# Patient Record
Sex: FEMALE | Race: WHITE | NOT HISPANIC OR LATINO | Employment: UNEMPLOYED | URBAN - METROPOLITAN AREA
[De-identification: names, ages, dates, MRNs, and addresses within clinical notes are randomized per-mention and may not be internally consistent; named-entity substitution may affect disease eponyms.]

---

## 2017-03-07 ENCOUNTER — NON-PROVIDER VISIT (OUTPATIENT)
Dept: OCCUPATIONAL MEDICINE | Facility: CLINIC | Age: 42
End: 2017-03-07

## 2017-03-07 DIAGNOSIS — Z02.1 PRE-EMPLOYMENT DRUG SCREENING: ICD-10-CM

## 2017-03-07 LAB
AMP AMPHETAMINE: NORMAL
COC COCAINE: NORMAL
INT CON NEG: NORMAL
INT CON POS: NORMAL
MET METHAMPHETAMINES: NORMAL
OPI OPIATES: NORMAL
PCP PHENCYCLIDINE: NORMAL
POC DRUG COMMENT 753798-OCCUPATIONAL HEALTH: NORMAL
THC: NORMAL

## 2017-03-07 PROCEDURE — 80305 DRUG TEST PRSMV DIR OPT OBS: CPT | Performed by: PREVENTIVE MEDICINE

## 2018-06-13 ENCOUNTER — HOSPITAL ENCOUNTER (OUTPATIENT)
Facility: MEDICAL CENTER | Age: 43
End: 2018-06-14
Attending: OBSTETRICS & GYNECOLOGY | Admitting: OBSTETRICS & GYNECOLOGY

## 2018-06-13 VITALS
WEIGHT: 157 LBS | BODY MASS INDEX: 23.79 KG/M2 | HEIGHT: 68 IN | SYSTOLIC BLOOD PRESSURE: 132 MMHG | DIASTOLIC BLOOD PRESSURE: 78 MMHG | RESPIRATION RATE: 18 BRPM | HEART RATE: 95 BPM | TEMPERATURE: 97.8 F

## 2018-06-14 NOTE — PROGRESS NOTES
"  edc unknown  lmp unknown  gbs unknown    2330: pt to labor and delivery claiming she is \"5 months pregnant\", but does not know her lmp.  Pt states she is moving and was experiencing cramping.   Pt denies vaginal bleeding or leaking.  When asked by rn how many times she's been pregnant patient states more than 10.  Pt was seen at [facility] in , history of fetal demise with last pregnancy.  Pt states she has had 2 live births that were vaginal deliveries.  toco applied. Doppler fetal heart tones at 155 bpm.  vss.  Pt states she has an appointment end of .  Pt has an appointment with [facility] . Pt states she wants a picture when we do the ultrasound.  rn told patient that we will check and make sure she is not zuhair, however we probably will not be doing an ultrasound.  2350: Report to jackie magaña cnm.  Orders to d/c home if no uc's.   0015: pt denies cramping and states she feels a lot better since laying down and relaxing.  Pt discharged home in stable condition.  Pt given  labor precautions.  "

## 2018-07-16 ENCOUNTER — HOSPITAL ENCOUNTER (OUTPATIENT)
Facility: MEDICAL CENTER | Age: 43
End: 2018-07-16
Attending: PHYSICIAN ASSISTANT
Payer: MEDICAID

## 2018-07-16 ENCOUNTER — INITIAL PRENATAL (OUTPATIENT)
Dept: OBGYN | Facility: CLINIC | Age: 43
End: 2018-07-16
Payer: MEDICAID

## 2018-07-16 VITALS
WEIGHT: 162 LBS | DIASTOLIC BLOOD PRESSURE: 62 MMHG | HEIGHT: 68 IN | BODY MASS INDEX: 24.55 KG/M2 | SYSTOLIC BLOOD PRESSURE: 128 MMHG

## 2018-07-16 DIAGNOSIS — O09.893 SUPERVISION OF OTHER HIGH RISK PREGNANCIES, THIRD TRIMESTER: ICD-10-CM

## 2018-07-16 DIAGNOSIS — O09.523 ADVANCED MATERNAL AGE IN MULTIGRAVIDA, THIRD TRIMESTER: Primary | ICD-10-CM

## 2018-07-16 DIAGNOSIS — Z67.91 RH NEGATIVE STATE IN ANTEPARTUM PERIOD, THIRD TRIMESTER: ICD-10-CM

## 2018-07-16 DIAGNOSIS — O26.893 RH NEGATIVE STATE IN ANTEPARTUM PERIOD, THIRD TRIMESTER: ICD-10-CM

## 2018-07-16 DIAGNOSIS — Z32.01 PREGNANCY EXAMINATION OR TEST, POSITIVE RESULT: ICD-10-CM

## 2018-07-16 DIAGNOSIS — O09.523 ADVANCED MATERNAL AGE IN MULTIGRAVIDA, THIRD TRIMESTER: ICD-10-CM

## 2018-07-16 DIAGNOSIS — Z34.93 PREGNANCY WITH ADOPTION PLANNED, CURRENTLY IN THIRD TRIMESTER: ICD-10-CM

## 2018-07-16 PROBLEM — F19.11 HISTORY OF DRUG ABUSE (HCC): Status: ACTIVE | Noted: 2018-07-16

## 2018-07-16 PROBLEM — F12.90 MARIJUANA USE: Status: ACTIVE | Noted: 2018-07-16

## 2018-07-16 PROBLEM — O99.333 TOBACCO USE COMPLICATING PREGNANCY, THIRD TRIMESTER: Status: ACTIVE | Noted: 2018-07-16

## 2018-07-16 LAB
APPEARANCE UR: NORMAL
BILIRUB UR STRIP-MCNC: NORMAL MG/DL
COLOR UR AUTO: NORMAL
GLUCOSE UR STRIP.AUTO-MCNC: NEGATIVE MG/DL
INT CON NEG: NEGATIVE
INT CON POS: POSITIVE
KETONES UR STRIP.AUTO-MCNC: NORMAL MG/DL
LEUKOCYTE ESTERASE UR QL STRIP.AUTO: NORMAL
NITRITE UR QL STRIP.AUTO: NEGATIVE
PH UR STRIP.AUTO: 6 [PH] (ref 5–8)
POC URINE PREGNANCY TEST: POSITIVE
PROT UR QL STRIP: NORMAL MG/DL
RBC UR QL AUTO: NEGATIVE
SP GR UR STRIP.AUTO: 1.03
UROBILINOGEN UR STRIP-MCNC: NORMAL MG/DL

## 2018-07-16 PROCEDURE — 81025 URINE PREGNANCY TEST: CPT | Performed by: PHYSICIAN ASSISTANT

## 2018-07-16 PROCEDURE — 88175 CYTOPATH C/V AUTO FLUID REDO: CPT

## 2018-07-16 PROCEDURE — 59402 PR NEW OB HIGH RISK: CPT | Performed by: PHYSICIAN ASSISTANT

## 2018-07-16 PROCEDURE — 87591 N.GONORRHOEAE DNA AMP PROB: CPT

## 2018-07-16 PROCEDURE — 87624 HPV HI-RISK TYP POOLED RSLT: CPT

## 2018-07-16 PROCEDURE — 87491 CHLMYD TRACH DNA AMP PROBE: CPT

## 2018-07-16 PROCEDURE — 81002 URINALYSIS NONAUTO W/O SCOPE: CPT | Performed by: PHYSICIAN ASSISTANT

## 2018-07-16 NOTE — PROGRESS NOTES
"New ob visit. Pt very unsure of LMP, but \"pretty sure\" of when she conceived. Pt hasnt had PNC yet. Pt has hx of \"13 SABs or TABs\" as reported to MA but when asked about it by myself, pt denies, states she has only had \"3 SABs.\" Likely due to friend, Simeon, in room with her. Pt also had 2  in ,  at term without complications, no GDM, PIH or delivery complications. Pt then had 2 more SAB, which pt reported as one at 24wk, then 8 wk. Notes in computer show the 24wk SAB was actually 16wk SAB and pt was on meth and marijuana at that time. Pt is very poor historian.     PMHx: denies, though arthritis is on problem list  SHx: Inguinal hernia repair at 13, 15yo - no surgical or anesthetic complications  Social: Current everyday smoker, 5 cig/day, hasnt cut back in pregnancy. Denies etoh use. Pt states she \"smoked marijuana twice\" during this pregnancy and has hx of drug use (pt refuses to specify which drugs) but states she quit in . The SAB at 16wk reports meth use at that time and was in , so some inaccuracies there. Pt also mentions she will be adopting out, working with agency but hasnt chosen family yet, as she \"needs [place] photos\" before they'll let her do that. Pt states she is \"doing ok emotionally\" with the adoption, but unsure how she will do. Pt urged to get counseling and communicate with us.     NKDA.   Taking PNV, Motrin. Pt instructed to stop Motrin use, only use Tylenol prn.     Pt currently denies cramping, bleeding or pain. +FM - FKC sheet given today.     PE: See below. Size c/w 26wk uterus, but very low fetal station, so likely JON accurate. +FHT by Doppler.     A/P: 1. IUP at 29w4d by estimated date of conception - PAP, GC/CT today. PNL, 1hr GTT slip given. US next avail. RTC 2 wks.  2. AMA - too late for AFP, declines counseling  3. Adopting out - working with agency but hasnt chosen family yet as she needs \"photo from [place]\"  4. Hx drug use - some marijuana use in pregnancy, hx meth " use  5. Late entry PNC - too late for AFP  6. Habitual aborter - per pt SAB and TABs

## 2018-07-16 NOTE — PROGRESS NOTES
NOB today and Pap smear today  LMP: 12/21/2018  Patient is taking PNV  Last pap 4 years ago   Phone # [phone]  Pharmacy confirmed  c/o: None    BTL Desired   Kick count given w/ instructions   Tdap declined

## 2018-07-16 NOTE — LETTER
Cystic Fibrosis Carrier Testing  Clare Roth    The following information is about a blood test that can be done to determine if you and/or your partner carry the gene for cystic fibrosis.    WHAT IS CYSTIC FIBROSIS?  · Cystic fibrosis (CF) is an inherited disease that affects more than 25,000 American children and young adults.  · Symptoms of CF vary but include lung congestion, pneumonia, diarrhea and poor growth.  Most people with CF have severe medical problems and some die at a young age.  Others have so few symptoms they are unaware they have CF.  · CF does not affect intelligence.  · Although there is no cure for CF at this time, scientists are making progress in improving treatment and in searching for a cure.  In the past many people with CF  at a very young age.  Today, many are living into their 20’s and 30’s.    IS THERE A CHANCE MY BABY COULD HAVE CYSTIC FIBROSIS?  · You can have a child with CF even if there is no history in your family (see chart below).  · CF testing can help determine if you are a carrier and at risk to have a child with CF.  Note: if both parents are carriers, there is a 1 in 4 (25%) chance with each pregnancy that they will have a child with CF.  · Carriers have one normal CF gene and one altered CF gene.  · People with CF have two altered CF genes.  · Most people have two normal copies of the CF gene.    Approximate risk that a couple with no family history of cystic fibrosis will have a child with cystic fibrosis:    Ethnic background / Risk     couple:  1 in 2,500   couple:  1 in 15,000            couple:  1 in 8,000     American couple:  1 in 32,000     WHAT TESTING IS AVAILABLE?  · There is a blood test that can be done to find out if you or your partner is a carrier.  · It is important to understand that CF carrier testing does not detect all CF carriers.  · If the test shows that you are both CF carriers, you unborn baby can  be tested to find out if the baby has CF.    HOW MUCH DOES IT COST TO HAVE CYSTIC FIBROSIS CARRIER TESTING?  · Cost and insurance coverage for CF carrier testing vary depending upon the laboratory used and your insurance policy.  · The average cost for CF carrier testing is $300 per person.  · Your genetic counselor can provide you with more information about cystic fibrosis carrier testing.    _____  Yes, I am interested in discussing carrier testing with a genetic counselor.    _____  No, I am not interested in CF carrier testing or in receiving more information about CF carrier testing.      Client signature: ________________________________________  7/16/2018

## 2018-07-16 NOTE — LETTER
"Count Your Baby's Movements  Another step to a healthy delivery    Clare Roth              Dept: [phone]    How Many Weeks Pregnant 29w 4d     Date to Begin Countin2018              How to use this chart    One way for your physician to keep track of your baby's health is by knowing how often the baby moves (or \"kicks\") in your womb.  You can help your physician to do this by using this chart every day.    Every day, you should see how many hours it takes for your baby to move 10 times.  Start in the morning, as soon as you get up.    · First, write down the time your baby moves until you get to 10.  · Check off one box every time your baby moves until you get to 10.  · Write down the time you finished counting in the last column.  · Total how long it took to count up all 10 movements.  · Finally, fill in the box that shows how long this took.  After counting 10 movements, you no longer have to count any more that day.  The next morning, just start counting again as soon as you get up.    What should you call a \"movement\"?  It is hard to say, because it will feel different from one mother to another and from one pregnancy to the next.  The important thing is that you count the movements the same way throughout your pregnancy.  If you have more questions, you should ask your physician.    Count carefully every day!  SAMPLE:  Week 28    How many hours did it take to feel 10 movements?       Start  Time     1     2     3     4     5     6     7     8     9     10   Finish Time   Mon 8:20 ·  ·  ·  ·  ·  ·  ·  ·  ·  ·  11:40                  Sat               Sun                 IMPORTANT: You should contact your physician if it takes more than two hours for you to feel 10 movements.  Each morning, write down the time and start to count the movements of your baby.  Keep track by checking off one box every time you feel one movement.  When you " "have felt 10 \"kicks\", write down the time you finished counting in the last column.  Then fill in the   box (over the check selina) for the number of hours it took.  Be sure to read the complete instructions on the previous page.            "

## 2018-07-21 LAB
C TRACH DNA GENITAL QL NAA+PROBE: NEGATIVE
CYTOLOGY REG CYTOL: ABNORMAL
HPV HR 12 DNA CVX QL NAA+PROBE: NEGATIVE
HPV16 DNA SPEC QL NAA+PROBE: POSITIVE
HPV18 DNA SPEC QL NAA+PROBE: NEGATIVE
N GONORRHOEA DNA GENITAL QL NAA+PROBE: NEGATIVE
SPECIMEN SOURCE: ABNORMAL
SPECIMEN SOURCE: ABNORMAL

## 2018-07-23 PROBLEM — R87.810 CERVICAL HIGH RISK HPV (HUMAN PAPILLOMAVIRUS) TEST POSITIVE: Chronic | Status: ACTIVE | Noted: 2018-07-23

## 2018-07-23 PROBLEM — R87.810 CERVICAL HIGH RISK HPV (HUMAN PAPILLOMAVIRUS) TEST POSITIVE: Status: ACTIVE | Noted: 2018-07-23

## 2018-07-26 ENCOUNTER — APPOINTMENT (OUTPATIENT)
Dept: LAB | Facility: MEDICAL CENTER | Age: 43
End: 2018-07-26
Attending: PHYSICIAN ASSISTANT
Payer: MEDICAID

## 2018-07-26 LAB
ABO GROUP BLD: NORMAL
BLD GP AB SCN SERPL QL: NORMAL
GLUCOSE 1H P 50 G GLC PO SERPL-MCNC: 141 MG/DL
HBV SURFACE AG SERPL QL IA: NEGATIVE
HCT VFR BLD AUTO: 32.2 %
HGB BLD-MCNC: 10.6 G/DL
HIV 1 0 2 IC ZHVIC: NORMAL
PLATELET # BLD AUTO: 220 10*3/UL
RH BLD: NORMAL
RUBV IGG SERPL IA-ACNC: NORMAL
TREPONEMA PALLIDUM IGG+IGM AB [PRESENCE] IN SERUM OR PLASMA BY IMMUNOASSAY: NORMAL

## 2018-07-26 PROCEDURE — 87186 SC STD MICRODIL/AGAR DIL: CPT

## 2018-07-26 PROCEDURE — 86900 BLOOD TYPING SEROLOGIC ABO: CPT

## 2018-07-26 PROCEDURE — 87077 CULTURE AEROBIC IDENTIFY: CPT

## 2018-07-26 PROCEDURE — 86762 RUBELLA ANTIBODY: CPT

## 2018-07-26 PROCEDURE — 86901 BLOOD TYPING SEROLOGIC RH(D): CPT

## 2018-07-26 PROCEDURE — 87086 URINE CULTURE/COLONY COUNT: CPT

## 2018-07-26 PROCEDURE — 81001 URINALYSIS AUTO W/SCOPE: CPT

## 2018-07-26 PROCEDURE — 87340 HEPATITIS B SURFACE AG IA: CPT

## 2018-07-26 PROCEDURE — 85025 COMPLETE CBC W/AUTO DIFF WBC: CPT

## 2018-07-26 PROCEDURE — 86780 TREPONEMA PALLIDUM: CPT

## 2018-07-26 PROCEDURE — 82950 GLUCOSE TEST: CPT

## 2018-07-26 PROCEDURE — 36415 COLL VENOUS BLD VENIPUNCTURE: CPT

## 2018-07-26 PROCEDURE — 86850 RBC ANTIBODY SCREEN: CPT

## 2018-07-26 PROCEDURE — 87389 HIV-1 AG W/HIV-1&-2 AB AG IA: CPT

## 2018-07-31 ENCOUNTER — ROUTINE PRENATAL (OUTPATIENT)
Dept: OBGYN | Facility: CLINIC | Age: 43
End: 2018-07-31
Payer: MEDICAID

## 2018-07-31 VITALS — DIASTOLIC BLOOD PRESSURE: 64 MMHG | SYSTOLIC BLOOD PRESSURE: 118 MMHG | BODY MASS INDEX: 24.02 KG/M2 | WEIGHT: 158 LBS

## 2018-07-31 DIAGNOSIS — R87.810 CERVICAL HIGH RISK HPV (HUMAN PAPILLOMAVIRUS) TEST POSITIVE: ICD-10-CM

## 2018-07-31 DIAGNOSIS — O99.333 TOBACCO USE COMPLICATING PREGNANCY, THIRD TRIMESTER: ICD-10-CM

## 2018-07-31 DIAGNOSIS — Z34.93 PREGNANCY WITH ADOPTION PLANNED, CURRENTLY IN THIRD TRIMESTER: ICD-10-CM

## 2018-07-31 DIAGNOSIS — Z67.91 RH NEGATIVE STATE IN ANTEPARTUM PERIOD, THIRD TRIMESTER: ICD-10-CM

## 2018-07-31 DIAGNOSIS — O09.523 ADVANCED MATERNAL AGE IN MULTIGRAVIDA, THIRD TRIMESTER: ICD-10-CM

## 2018-07-31 DIAGNOSIS — F19.11 HISTORY OF DRUG ABUSE (HCC): ICD-10-CM

## 2018-07-31 DIAGNOSIS — F12.90 MARIJUANA USE: ICD-10-CM

## 2018-07-31 DIAGNOSIS — R73.09 ELEVATED GLUCOSE: ICD-10-CM

## 2018-07-31 DIAGNOSIS — O26.893 RH NEGATIVE STATE IN ANTEPARTUM PERIOD, THIRD TRIMESTER: ICD-10-CM

## 2018-07-31 PROCEDURE — 96372 THER/PROPH/DIAG INJ SC/IM: CPT | Performed by: NURSE PRACTITIONER

## 2018-07-31 RX ORDER — NITROFURANTOIN 25; 75 MG/1; MG/1
100 CAPSULE ORAL 2 TIMES DAILY
Qty: 14 CAP | Refills: 0 | Status: SHIPPED | OUTPATIENT
Start: 2018-07-31 | End: 2018-08-07

## 2018-07-31 NOTE — PROGRESS NOTES
SUBJECTIVE:  Pt is a 42 y.o.   at 31w5d  gestation. Presents today for follow-up prenatal care. Reports no issues at this time. Adopting out.  Reports good  fetal movement. Denies cramping/contractions, bleeding or leaking of fluid. Denies dysuria, headaches, N/V, or other issues at this time. Generally feels well today. States clean from drugs and alcohol.     OBJECTIVE:  - See prenatal vitals flow  -   Vitals:    18 0920   BP: 118/64   Weight: 71.7 kg (158 lb)      Labs - none resulted RH negative as known from prior pregnancy  US - scheduled for Friday.            ASSESSMENT:   - IUP at 31w5d    - S<D   -   Patient Active Problem List    Diagnosis Date Noted   • PAP NILM, HPV + - repeat cotesting 1 yr 2018   • Tobacco use complicating pregnancy, third trimester 2018   • Marijuana use 2018   • History of drug abuse - meth use in 2018   • Advanced maternal age in multigravida, third trimester 2018   • Pregnancy with adoption planned, currently in third trimester 2018   • Rh negative state in antepartum period, third trimester 2018   • Habitual aborter, currently pregnant 2014         PLAN:  - S/sx pregnancy and labor warning signs vs general discomforts discussed  - Fetal movements and kick counts reviewed   - Adequate hydration reinforced  - Nutrition/exercise/vitamin education; continued PNV  - BTL papers on file.   MA calling next door for lab results.   Rhogam today as patient is known Rh negative and is likely 28 week by fundal height.   Declines tdap - states dislikes needles.     Labs reviewed as received from [facility] lab. Elevated 1 hour with 3 hour lab slip explained. Macrobid to pharmacy for e coli UTI. Start iron supplement.

## 2018-07-31 NOTE — PROGRESS NOTES
Pt. Here for OB/FU today. Reports Good FM.  U/S on 8/3/18  Good # [phone]  Pt states no complaints.    Pharmacy verified.   Pt states did PNP and 1 HR GTT on 7/25/18  Tdap declined  Chaperone offered pt declined

## 2018-07-31 NOTE — PROGRESS NOTES
Rhogam injection given today.   NDC: 23020-253-92  LOT#: [id number]  Expiration Date: 10/20/2020  Dose: 300 mcg  Site: Right Upper Outer Quadrant Gluteal  Patient educated on use and side effects of medication. Name and  verified prior to injection. Pt tolerated? well   Verified by Yanni GARCIA  Administered by Caty Lundberg at 9:52 AM.  Patient Provided Medication: no

## 2018-08-03 ENCOUNTER — HOSPITAL ENCOUNTER (OUTPATIENT)
Dept: RADIOLOGY | Facility: MEDICAL CENTER | Age: 43
End: 2018-08-03
Attending: PHYSICIAN ASSISTANT
Payer: MEDICAID

## 2018-08-03 DIAGNOSIS — O09.523 ADVANCED MATERNAL AGE IN MULTIGRAVIDA, THIRD TRIMESTER: ICD-10-CM

## 2018-08-03 PROCEDURE — 76805 OB US >/= 14 WKS SNGL FETUS: CPT

## 2018-08-06 ENCOUNTER — DATING (OUTPATIENT)
Dept: OBGYN | Facility: CLINIC | Age: 43
End: 2018-08-06

## 2018-08-07 ENCOUNTER — TELEPHONE (OUTPATIENT)
Dept: OBGYN | Facility: CLINIC | Age: 43
End: 2018-08-07

## 2018-08-07 NOTE — TELEPHONE ENCOUNTER
----- Message from Charity Cheatham M.D. sent at 8/6/2018  9:02 AM PDT -----  US c/w estimated date of conception  Poorly visualized LE anatomy  HUNTER 6.3    MA: please let pt know US was OK, plan to repeat to check fluid level and re-visualize poorly seen anatomy in 1-2 wks.  8/7/18@2:15 pm. N/a, msg left for patient to call back.

## 2018-08-24 ENCOUNTER — TELEPHONE (OUTPATIENT)
Dept: OBGYN | Facility: CLINIC | Age: 43
End: 2018-08-24

## 2018-08-24 NOTE — TELEPHONE ENCOUNTER
L/m for pt with results of ultrasound results and that she will have another ultrasound to recheck fluid.Strict kick counts advised.ketty

## 2018-08-24 NOTE — TELEPHONE ENCOUNTER
----- Message from Charity Cheatham M.D. sent at 8/6/2018  9:02 AM PDT -----  US c/w estimated date of conception  Poorly visualized LE anatomy  HUNTER 6.3    MA: please let pt know US was OK, plan to repeat to check fluid level and re-visualize poorly seen anatomy in 1-2wks

## 2018-08-27 ENCOUNTER — HOSPITAL ENCOUNTER (OUTPATIENT)
Facility: MEDICAL CENTER | Age: 43
End: 2018-08-27
Attending: NURSE PRACTITIONER
Payer: MEDICAID

## 2018-08-27 ENCOUNTER — ROUTINE PRENATAL (OUTPATIENT)
Dept: OBGYN | Facility: CLINIC | Age: 43
End: 2018-08-27
Payer: MEDICAID

## 2018-08-27 VITALS — SYSTOLIC BLOOD PRESSURE: 120 MMHG | WEIGHT: 163 LBS | DIASTOLIC BLOOD PRESSURE: 74 MMHG | BODY MASS INDEX: 24.78 KG/M2

## 2018-08-27 DIAGNOSIS — R87.810 CERVICAL HIGH RISK HPV (HUMAN PAPILLOMAVIRUS) TEST POSITIVE: ICD-10-CM

## 2018-08-27 DIAGNOSIS — O09.523 ADVANCED MATERNAL AGE IN MULTIGRAVIDA, THIRD TRIMESTER: Primary | ICD-10-CM

## 2018-08-27 DIAGNOSIS — Z34.93 PREGNANCY WITH ADOPTION PLANNED, CURRENTLY IN THIRD TRIMESTER: ICD-10-CM

## 2018-08-27 DIAGNOSIS — O26.893 RH NEGATIVE STATE IN ANTEPARTUM PERIOD, THIRD TRIMESTER: ICD-10-CM

## 2018-08-27 DIAGNOSIS — Z67.91 RH NEGATIVE STATE IN ANTEPARTUM PERIOD, THIRD TRIMESTER: ICD-10-CM

## 2018-08-27 DIAGNOSIS — O99.333 TOBACCO USE COMPLICATING PREGNANCY, THIRD TRIMESTER: ICD-10-CM

## 2018-08-27 DIAGNOSIS — O09.523 ADVANCED MATERNAL AGE IN MULTIGRAVIDA, THIRD TRIMESTER: ICD-10-CM

## 2018-08-27 DIAGNOSIS — O41.00X0 OLIGOHYDRAMNIOS, ANTEPARTUM, SINGLE OR UNSPECIFIED FETUS: ICD-10-CM

## 2018-08-27 DIAGNOSIS — F12.90 MARIJUANA USE: ICD-10-CM

## 2018-08-27 DIAGNOSIS — F19.11 HISTORY OF DRUG ABUSE (HCC): ICD-10-CM

## 2018-08-27 PROCEDURE — 87653 STREP B DNA AMP PROBE: CPT

## 2018-08-27 PROCEDURE — 90040 PR PRENATAL FOLLOW UP: CPT | Performed by: NURSE PRACTITIONER

## 2018-08-27 NOTE — PROGRESS NOTES
S) Pt is a 42 y.o.   at 35w4d  gestation. Routine prenatal care today. No complaints today. GBS collected today. Repeat US ordered to check HUNTER and LE on fetus. Labor precautions reviewed, all questions answered.    Fetal movement Normal  Cramping no  VB no  LOF no   Denies dysuria. Generally feels well today. Good self-care activities identified. Denies headaches, swelling, visual changes, or epigastric pain .     O) Blood pressure 120/74, weight 73.9 kg (163 lb), unknown if currently breastfeeding.        Labs:       PNL: WNL       GCT: 141, 3 hour in process        AFP: Not Examined       GBS: Collected today       Pertinent ultrasound -        8/3/18- Survey WNL, not able to see LE well. HUNTER 6.3cm, c/w prev dating.  Repeat US ordered today    A) IUP at 35w4d       S=D         Patient Active Problem List    Diagnosis Date Noted   • PAP NILM, HPV + - repeat cotesting 1 yr 2018   • Tobacco use complicating pregnancy, third trimester 2018   • Marijuana use 2018   • History of drug abuse - meth use in 2018   • Advanced maternal age in multigravida, third trimester 2018   • Pregnancy with adoption planned, currently in third trimester 2018   • Rh negative state in antepartum period, third trimester 2018   • Habitual aborter, currently pregnant 2014          SVE: deferred       Chaperone offered: yes and present         TDAP: no       FLU: no        BTL: yes       : n/a       C/S Consent: n/a       IOL or C/S scheduled: no       LAST PAP: 18- Negative, but +HR HPV. Needs repeat pap in 1 year         P) s/s ptl vs general discomforts. Fetal movements reviewed. General ed and anticipatory guidance. Nutrition/exercise/vitamin. Plans bottle Plans pp contraception- BTL.  Continue PNV.

## 2018-08-27 NOTE — LETTER
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  This patient is pregnant and may continue to work.  She should not lift greater than 20 pounds and requires frequent rest periods (10 minutes every two hours). Please allow access to water and restroom breaks. Thank you!        Thank you,          Ladan Soriano C.N.M.

## 2018-08-27 NOTE — PROGRESS NOTES
OB f/u. + fetal movement.  No VB, LOF or UC's.  Good phone # [phone]  GBS collected today  Chaperone provided  Pt denies any problems

## 2018-08-29 LAB — GP B STREP DNA SPEC QL NAA+PROBE: NEGATIVE

## 2018-08-30 ENCOUNTER — ROUTINE PRENATAL (OUTPATIENT)
Dept: OBGYN | Facility: CLINIC | Age: 43
End: 2018-08-30
Payer: MEDICAID

## 2018-08-30 VITALS — BODY MASS INDEX: 24.78 KG/M2 | SYSTOLIC BLOOD PRESSURE: 120 MMHG | WEIGHT: 163 LBS | DIASTOLIC BLOOD PRESSURE: 68 MMHG

## 2018-08-30 DIAGNOSIS — O09.523 ADVANCED MATERNAL AGE IN MULTIGRAVIDA, THIRD TRIMESTER: ICD-10-CM

## 2018-08-30 DIAGNOSIS — Z67.91 RH NEGATIVE STATE IN ANTEPARTUM PERIOD, THIRD TRIMESTER: ICD-10-CM

## 2018-08-30 DIAGNOSIS — O99.333 TOBACCO USE COMPLICATING PREGNANCY, THIRD TRIMESTER: ICD-10-CM

## 2018-08-30 DIAGNOSIS — R87.810 CERVICAL HIGH RISK HPV (HUMAN PAPILLOMAVIRUS) TEST POSITIVE: ICD-10-CM

## 2018-08-30 DIAGNOSIS — F12.90 MARIJUANA USE: ICD-10-CM

## 2018-08-30 DIAGNOSIS — O26.893 RH NEGATIVE STATE IN ANTEPARTUM PERIOD, THIRD TRIMESTER: ICD-10-CM

## 2018-08-30 DIAGNOSIS — Z34.93 PREGNANCY WITH ADOPTION PLANNED, CURRENTLY IN THIRD TRIMESTER: Primary | ICD-10-CM

## 2018-08-30 DIAGNOSIS — F19.11 HISTORY OF DRUG ABUSE (HCC): ICD-10-CM

## 2018-08-30 PROCEDURE — 90040 PR PRENATAL FOLLOW UP: CPT | Performed by: NURSE PRACTITIONER

## 2018-08-30 NOTE — PROGRESS NOTES
S) Pt is a 42 y.o.   at 36w0d  gestation. Routine prenatal care today. No complaints today. Does report some cramping at night. States it is getting to hard to work, and desires note releasing her from work until after delivery. Letter provided. GBS negative results discussed, all questions answered. Labor precautions reviewed.    Fetal movement Normal  Cramping no  VB no  LOF no   Denies dysuria. Generally feels well today. Good self-care activities identified. Denies headaches, swelling, visual changes, or epigastric pain .     O) Blood pressure 120/68, weight 73.9 kg (163 lb), unknown if currently breastfeeding.        Labs:       PNL: WNL       GCT: 141, states will do 3 hour this week, if not, will sign refusal next visit.        AFP: Not Examined       GBS: negative       Pertinent ultrasound -        18- Survey WNL, not able to see LE well. HUNTER 6.3cm, c/w prev dating.  Has repeat scan scheduled for 18    A) IUP at 36w0d       S=D         Patient Active Problem List    Diagnosis Date Noted   • PAP NILM, HPV + - repeat cotesting 1 yr 2018   • Tobacco use complicating pregnancy, third trimester 2018   • Marijuana use 2018   • History of drug abuse - meth use in 2018   • Advanced maternal age in multigravida, third trimester 2018   • Pregnancy with adoption planned, currently in third trimester 2018   • Rh negative state in antepartum period, third trimester 2018   • Habitual aborter, currently pregnant 2014          SVE: deferred       Chaperone offered: n/a         TDAP: no       FLU: no        BTL: yes       : n/a       C/S Consent: n/a       IOL or C/S scheduled: no       LAST PAP: 18- Negative with HR HPV, repeat in 1 year         P) s/s ptl vs general discomforts. Fetal movements reviewed. General ed and anticipatory guidance. Nutrition/exercise/vitamin. Plans bottle Plans pp contraception- BTL.  Continue PNV.

## 2018-08-30 NOTE — LETTER
[facility]  [address] [address]     August 30, 2018    Patient: Clare Roth   YOB: 1975   Date of Visit: 8/30/2018       To Whom It May Concern:    Clare Roth was seen and treated in our department on 8/30/2018.     She desires to be off work until after the baby is born. She will have a follow up postpartum appointment after delivery to clear her to return to work.  Please help her with this request.    Sincerely,     Ladan Soriano C.N.M.

## 2018-08-30 NOTE — PROGRESS NOTES
Pt. Here for OB/FU today. Reports Good FM.   Good # [phone]  Pt states has been having some cramping.  Pharmacy verified.

## 2018-09-06 ENCOUNTER — HOSPITAL ENCOUNTER (OUTPATIENT)
Dept: RADIOLOGY | Facility: MEDICAL CENTER | Age: 43
End: 2018-09-06
Attending: NURSE PRACTITIONER
Payer: MEDICAID

## 2018-09-06 ENCOUNTER — ROUTINE PRENATAL (OUTPATIENT)
Dept: OBGYN | Facility: CLINIC | Age: 43
End: 2018-09-06
Payer: MEDICAID

## 2018-09-06 VITALS — DIASTOLIC BLOOD PRESSURE: 68 MMHG | SYSTOLIC BLOOD PRESSURE: 116 MMHG | BODY MASS INDEX: 25.09 KG/M2 | WEIGHT: 165 LBS

## 2018-09-06 DIAGNOSIS — F19.11 HISTORY OF DRUG ABUSE (HCC): ICD-10-CM

## 2018-09-06 DIAGNOSIS — Z67.91 RH NEGATIVE STATE IN ANTEPARTUM PERIOD, THIRD TRIMESTER: ICD-10-CM

## 2018-09-06 DIAGNOSIS — O26.893 RH NEGATIVE STATE IN ANTEPARTUM PERIOD, THIRD TRIMESTER: ICD-10-CM

## 2018-09-06 DIAGNOSIS — F12.90 MARIJUANA USE: ICD-10-CM

## 2018-09-06 DIAGNOSIS — O99.333 TOBACCO USE COMPLICATING PREGNANCY, THIRD TRIMESTER: ICD-10-CM

## 2018-09-06 DIAGNOSIS — R87.810 CERVICAL HIGH RISK HPV (HUMAN PAPILLOMAVIRUS) TEST POSITIVE: ICD-10-CM

## 2018-09-06 DIAGNOSIS — Z34.93 PREGNANCY WITH ADOPTION PLANNED, CURRENTLY IN THIRD TRIMESTER: ICD-10-CM

## 2018-09-06 DIAGNOSIS — O41.00X0 OLIGOHYDRAMNIOS, ANTEPARTUM, SINGLE OR UNSPECIFIED FETUS: ICD-10-CM

## 2018-09-06 DIAGNOSIS — O09.523 ADVANCED MATERNAL AGE IN MULTIGRAVIDA, THIRD TRIMESTER: Primary | ICD-10-CM

## 2018-09-06 PROCEDURE — 76815 OB US LIMITED FETUS(S): CPT

## 2018-09-06 PROCEDURE — 90040 PR PRENATAL FOLLOW UP: CPT | Performed by: NURSE PRACTITIONER

## 2018-09-06 NOTE — PROGRESS NOTES
S:  Pt is  at 37w0d here for routine OB follow up.  No concerns today.  Reports good FM.  Denies VB, LOF, RUCs, or vaginal DC. Pt refuses to have 3 hr GTT drawn, says she feels fine. Refusal form signed. Had US for HUNTER done today.    O:  Please see above vitals.        FHTs: 145        Fundal ht: 33 cm        Fetal position: vertex        SVE: deferred        GBS negative -- reviewed w pt.          HUNTER: 6.8 today. Discussed findings with Dr Mccullough, plan for IOL at 39 wks with twice weekly NSTs    A:  IUP at 37w0d  Patient Active Problem List    Diagnosis Date Noted   • PAP NILM, HPV + - repeat cotesting 1 yr 2018   • Tobacco use complicating pregnancy, third trimester 2018   • Marijuana use 2018   • History of drug abuse - meth use in 2018   • Advanced maternal age in multigravida, third trimester 2018   • Pregnancy with adoption planned, currently in third trimester 2018   • Rh negative state in antepartum period, third trimester 2018   • Habitual aborter, currently pregnant 2014       P:  1.  Continue FKCs.         2.  Labor precautions given.  Instructions given on where to go.  Pt receptive to education.         3.  D/w pt IOL policy.  IOL referral sent.        4.  Questions answered.         5.  Encouraged adequate water intake       6.  F/u 1wk       7.  Twice weekly NSTs      .

## 2018-09-06 NOTE — PROGRESS NOTES
"Pt here today for OB follow up  Reports +FM  WT: 165 lb  BP: 116/68  Had follow up US for HUNTER today   Pt states having the ata buck. States no other concerns.    Pt states she is not going to do the 3 hour gtt, states \" I feel fine\" Pt states she wants to sign the refusal  Good # [phone]    "

## 2018-09-14 ENCOUNTER — ROUTINE PRENATAL (OUTPATIENT)
Dept: OBGYN | Facility: CLINIC | Age: 43
End: 2018-09-14
Payer: MEDICAID

## 2018-09-14 VITALS — DIASTOLIC BLOOD PRESSURE: 70 MMHG | SYSTOLIC BLOOD PRESSURE: 118 MMHG | BODY MASS INDEX: 25.54 KG/M2 | WEIGHT: 168 LBS

## 2018-09-14 DIAGNOSIS — F12.90 MARIJUANA USE: ICD-10-CM

## 2018-09-14 DIAGNOSIS — O99.333 TOBACCO USE COMPLICATING PREGNANCY, THIRD TRIMESTER: ICD-10-CM

## 2018-09-14 DIAGNOSIS — Z34.93 PREGNANCY WITH ADOPTION PLANNED, CURRENTLY IN THIRD TRIMESTER: ICD-10-CM

## 2018-09-14 DIAGNOSIS — O99.810 ABNORMAL GLUCOSE TOLERANCE IN MOTHER COMPLICATING PREGNANCY: ICD-10-CM

## 2018-09-14 DIAGNOSIS — O09.93 HIGH-RISK PREGNANCY SUPERVISION, THIRD TRIMESTER: Primary | ICD-10-CM

## 2018-09-14 DIAGNOSIS — Z67.91 RH NEGATIVE STATE IN ANTEPARTUM PERIOD, THIRD TRIMESTER: ICD-10-CM

## 2018-09-14 DIAGNOSIS — O09.523 ADVANCED MATERNAL AGE IN MULTIGRAVIDA, THIRD TRIMESTER: ICD-10-CM

## 2018-09-14 DIAGNOSIS — O26.843 UTERINE SIZE DATE DISCREPANCY PREGNANCY, THIRD TRIMESTER: ICD-10-CM

## 2018-09-14 DIAGNOSIS — O41.90X1: ICD-10-CM

## 2018-09-14 DIAGNOSIS — R87.810 CERVICAL HIGH RISK HPV (HUMAN PAPILLOMAVIRUS) TEST POSITIVE: ICD-10-CM

## 2018-09-14 DIAGNOSIS — O41.93X1 DISORDER OF AMNIOTIC FLUID AND MEMBRANES, UNSPECIFIED, THIRD TRIMESTER, FETUS 1: ICD-10-CM

## 2018-09-14 DIAGNOSIS — F19.11 HISTORY OF DRUG ABUSE (HCC): ICD-10-CM

## 2018-09-14 DIAGNOSIS — O26.893 RH NEGATIVE STATE IN ANTEPARTUM PERIOD, THIRD TRIMESTER: ICD-10-CM

## 2018-09-14 LAB
NST ACOUSTIC STIMULATION: NO
NST ACTION NECESSARY: NORMAL
NST ASSESSMENT: NORMAL
NST BASELINE: NORMAL
NST INDICATIONS: NORMAL
NST OTHER DATA: NORMAL
NST READ BY: NORMAL
NST RETURN: NORMAL
NST UTERINE ACTIVITY: NO

## 2018-09-14 PROCEDURE — 90040 PR PRENATAL FOLLOW UP: CPT | Performed by: OBSTETRICS & GYNECOLOGY

## 2018-09-14 PROCEDURE — 59025 FETAL NON-STRESS TEST: CPT | Performed by: OBSTETRICS & GYNECOLOGY

## 2018-09-14 PROCEDURE — 76815 OB US LIMITED FETUS(S): CPT | Performed by: OBSTETRICS & GYNECOLOGY

## 2018-09-14 NOTE — PROGRESS NOTES
Limited transabdominal ultrasound performed for HUNTER:    Malagon intrauterine pregnancy in vertex presentation noted  Good fetal movements noted  Normal breathing, movement and tone noted  Amniotic fluid index is 7.9 cm  Findings reviewed with patient  We will continue twice weekly NSTs  Kick counts and precautions were reviewed

## 2018-09-14 NOTE — PROGRESS NOTES
Pt here today for OB follow up  Pt states no complaint   Reports +FM  Good # [phone]  Pharmacy Confirmed.  Chaperone offered and declined.   GEL 09/20 @ 9:00pm  IOL 09/21 @ 9:00am

## 2018-09-14 NOTE — PROGRESS NOTES
S: Pt presents for routine OB follow up. Reports Good fetal movement and normal kick counts  No contractions, vaginal bleeding, or leakage of fluid.  Reports difficulty sleeping-has not tried any meds. Sleep aid use discussed. Has IOL . Has borderline low HUNTER and is on 2x wk NST.  Questions answered.    O: /70   Wt 76.2 kg (168 lb)   BMI 25.54 kg/m²   Patients' weight gain, fluid intake and exercise level discussed.  Vitals, fundal height , fetal position, and FHR reviewed on flowsheet    Lab:No results found for this or any previous visit (from the past 336 hour(s)).    A/P:  42 y.o.  at 38w1d presents for routine obstetric follow-up.  Size less than  dates likely due low HUNTER.    Encounter Diagnoses   Name Primary?   • High-risk pregnancy supervision, third trimester Yes   • Cervical high risk HPV (human papillomavirus) test positive    • Tobacco use complicating pregnancy, third trimester    • Marijuana use    • History of drug abuse    • Advanced maternal age in multigravida, third trimester    • Pregnancy with adoption planned, currently in third trimester    • Rh negative state in antepartum period, third trimester    • Abnormal glucose tolerance in mother complicating pregnancy    • Disorder of amniotic fluid and membranes, unspecified, third trimester, fetus 1        1.  Continue prenatal vitamins.  2.  Continue Fetal kick counts daily.  3.  Exercise at least 30 minutes daily.  4.  Increase water intake.  5.  Pregnancy and Labor precautions reviewed  6.  Follow-up in 1week for OB and 2x wk NST.  7.  GBS neg  8. IOL  scheduled

## 2018-09-17 ENCOUNTER — HOSPITAL ENCOUNTER (INPATIENT)
Facility: MEDICAL CENTER | Age: 43
LOS: 2 days | End: 2018-09-19
Attending: OBSTETRICS & GYNECOLOGY | Admitting: OBSTETRICS & GYNECOLOGY
Payer: MEDICAID

## 2018-09-17 DIAGNOSIS — O09.523 ADVANCED MATERNAL AGE IN MULTIGRAVIDA, THIRD TRIMESTER: ICD-10-CM

## 2018-09-17 LAB
BASOPHILS # BLD AUTO: 0.2 % (ref 0–1.8)
BASOPHILS # BLD: 0.02 K/UL (ref 0–0.12)
EOSINOPHIL # BLD AUTO: 0.07 K/UL (ref 0–0.51)
EOSINOPHIL NFR BLD: 0.6 % (ref 0–6.9)
ERYTHROCYTE [DISTWIDTH] IN BLOOD BY AUTOMATED COUNT: 40.5 FL (ref 35.9–50)
HCT VFR BLD AUTO: 33.9 % (ref 37–47)
HGB BLD-MCNC: 11.3 G/DL (ref 12–16)
HOLDING TUBE BB 8507: NORMAL
IMM GRANULOCYTES # BLD AUTO: 0.05 K/UL (ref 0–0.11)
IMM GRANULOCYTES NFR BLD AUTO: 0.5 % (ref 0–0.9)
LYMPHOCYTES # BLD AUTO: 2.15 K/UL (ref 1–4.8)
LYMPHOCYTES NFR BLD: 19.6 % (ref 22–41)
MCH RBC QN AUTO: 29 PG (ref 27–33)
MCHC RBC AUTO-ENTMCNC: 33.3 G/DL (ref 33.6–35)
MCV RBC AUTO: 87.1 FL (ref 81.4–97.8)
MONOCYTES # BLD AUTO: 0.81 K/UL (ref 0–0.85)
MONOCYTES NFR BLD AUTO: 7.4 % (ref 0–13.4)
NEUTROPHILS # BLD AUTO: 7.85 K/UL (ref 2–7.15)
NEUTROPHILS NFR BLD: 71.7 % (ref 44–72)
NRBC # BLD AUTO: 0 K/UL
NRBC BLD-RTO: 0 /100 WBC
PLATELET # BLD AUTO: 264 K/UL (ref 164–446)
PMV BLD AUTO: 11.7 FL (ref 9–12.9)
RBC # BLD AUTO: 3.89 M/UL (ref 4.2–5.4)
WBC # BLD AUTO: 11 K/UL (ref 4.8–10.8)

## 2018-09-17 PROCEDURE — 700105 HCHG RX REV CODE 258

## 2018-09-17 PROCEDURE — 700111 HCHG RX REV CODE 636 W/ 250 OVERRIDE (IP)

## 2018-09-17 PROCEDURE — 85025 COMPLETE CBC W/AUTO DIFF WBC: CPT

## 2018-09-17 PROCEDURE — 700105 HCHG RX REV CODE 258: Performed by: NURSE PRACTITIONER

## 2018-09-17 PROCEDURE — 770002 HCHG ROOM/CARE - OB PRIVATE (112)

## 2018-09-17 RX ORDER — TERBUTALINE SULFATE 1 MG/ML
0.25 INJECTION, SOLUTION SUBCUTANEOUS PRN
Status: DISCONTINUED | OUTPATIENT
Start: 2018-09-17 | End: 2018-09-18 | Stop reason: HOSPADM

## 2018-09-17 RX ORDER — ROPIVACAINE HYDROCHLORIDE 2 MG/ML
INJECTION, SOLUTION EPIDURAL; INFILTRATION; PERINEURAL
Status: COMPLETED
Start: 2018-09-17 | End: 2018-09-17

## 2018-09-17 RX ORDER — SODIUM CHLORIDE, SODIUM LACTATE, POTASSIUM CHLORIDE, CALCIUM CHLORIDE 600; 310; 30; 20 MG/100ML; MG/100ML; MG/100ML; MG/100ML
INJECTION, SOLUTION INTRAVENOUS CONTINUOUS
Status: DISCONTINUED | OUTPATIENT
Start: 2018-09-17 | End: 2018-09-18 | Stop reason: HOSPADM

## 2018-09-17 RX ORDER — CARBOPROST TROMETHAMINE 250 UG/ML
250 INJECTION, SOLUTION INTRAMUSCULAR
Status: DISCONTINUED | OUTPATIENT
Start: 2018-09-17 | End: 2018-09-18 | Stop reason: HOSPADM

## 2018-09-17 RX ORDER — DEXTROSE, SODIUM CHLORIDE, SODIUM LACTATE, POTASSIUM CHLORIDE, AND CALCIUM CHLORIDE 5; .6; .31; .03; .02 G/100ML; G/100ML; G/100ML; G/100ML; G/100ML
INJECTION, SOLUTION INTRAVENOUS CONTINUOUS
Status: DISCONTINUED | OUTPATIENT
Start: 2018-09-18 | End: 2018-09-18 | Stop reason: HOSPADM

## 2018-09-17 RX ORDER — ACETAMINOPHEN 325 MG/1
650 TABLET ORAL EVERY 4 HOURS PRN
COMMUNITY
End: 2021-05-31

## 2018-09-17 RX ORDER — OXYTOCIN 10 [USP'U]/ML
10 INJECTION, SOLUTION INTRAMUSCULAR; INTRAVENOUS
Status: DISCONTINUED | OUTPATIENT
Start: 2018-09-17 | End: 2018-09-18 | Stop reason: HOSPADM

## 2018-09-17 RX ORDER — SODIUM CHLORIDE, SODIUM LACTATE, POTASSIUM CHLORIDE, CALCIUM CHLORIDE 600; 310; 30; 20 MG/100ML; MG/100ML; MG/100ML; MG/100ML
INJECTION, SOLUTION INTRAVENOUS
Status: COMPLETED
Start: 2018-09-17 | End: 2018-09-17

## 2018-09-17 RX ORDER — CITRIC ACID/SODIUM CITRATE 334-500MG
30 SOLUTION, ORAL ORAL EVERY 6 HOURS PRN
Status: DISCONTINUED | OUTPATIENT
Start: 2018-09-17 | End: 2018-09-18 | Stop reason: HOSPADM

## 2018-09-17 RX ORDER — MISOPROSTOL 200 UG/1
800 TABLET ORAL
Status: COMPLETED | OUTPATIENT
Start: 2018-09-17 | End: 2018-09-18

## 2018-09-17 RX ADMIN — SODIUM CHLORIDE, POTASSIUM CHLORIDE, SODIUM LACTATE AND CALCIUM CHLORIDE: 600; 310; 30; 20 INJECTION, SOLUTION INTRAVENOUS at 23:00

## 2018-09-17 RX ADMIN — FENTANYL CITRATE 100 MCG: 50 INJECTION, SOLUTION INTRAMUSCULAR; INTRAVENOUS at 22:50

## 2018-09-17 RX ADMIN — SODIUM CHLORIDE, POTASSIUM CHLORIDE, SODIUM LACTATE AND CALCIUM CHLORIDE 1000 ML: 600; 310; 30; 20 INJECTION, SOLUTION INTRAVENOUS at 21:30

## 2018-09-17 RX ADMIN — ROPIVACAINE HYDROCHLORIDE 100 ML: 2 INJECTION, SOLUTION EPIDURAL; INFILTRATION at 23:14

## 2018-09-17 ASSESSMENT — PATIENT HEALTH QUESTIONNAIRE - PHQ9
SUM OF ALL RESPONSES TO PHQ9 QUESTIONS 1 AND 2: 0
2. FEELING DOWN, DEPRESSED, IRRITABLE, OR HOPELESS: NOT AT ALL
1. LITTLE INTEREST OR PLEASURE IN DOING THINGS: NOT AT ALL

## 2018-09-17 ASSESSMENT — LIFESTYLE VARIABLES
ALCOHOL_USE: NO
EVER_SMOKED: YES

## 2018-09-18 LAB
ERYTHROCYTE [DISTWIDTH] IN BLOOD BY AUTOMATED COUNT: 40.3 FL (ref 35.9–50)
HCT VFR BLD AUTO: 30.9 % (ref 37–47)
HGB BLD-MCNC: 10.4 G/DL (ref 12–16)
MCH RBC QN AUTO: 29.4 PG (ref 27–33)
MCHC RBC AUTO-ENTMCNC: 33.7 G/DL (ref 33.6–35)
MCV RBC AUTO: 87.3 FL (ref 81.4–97.8)
NUMBER OF RH DOSES IND 8505RD: NORMAL
PLATELET # BLD AUTO: 202 K/UL (ref 164–446)
PMV BLD AUTO: 11.3 FL (ref 9–12.9)
RBC # BLD AUTO: 3.54 M/UL (ref 4.2–5.4)
WBC # BLD AUTO: 12.4 K/UL (ref 4.8–10.8)

## 2018-09-18 PROCEDURE — 770002 HCHG ROOM/CARE - OB PRIVATE (112)

## 2018-09-18 PROCEDURE — 303615 HCHG EPIDURAL/SPINAL ANESTHESIA FOR LABOR

## 2018-09-18 PROCEDURE — 90732 PPSV23 VACC 2 YRS+ SUBQ/IM: CPT | Performed by: OBSTETRICS & GYNECOLOGY

## 2018-09-18 PROCEDURE — 700102 HCHG RX REV CODE 250 W/ 637 OVERRIDE(OP): Performed by: NURSE PRACTITIONER

## 2018-09-18 PROCEDURE — 59414 DELIVER PLACENTA: CPT

## 2018-09-18 PROCEDURE — 700111 HCHG RX REV CODE 636 W/ 250 OVERRIDE (IP)

## 2018-09-18 PROCEDURE — 90471 IMMUNIZATION ADMIN: CPT

## 2018-09-18 PROCEDURE — 36415 COLL VENOUS BLD VENIPUNCTURE: CPT

## 2018-09-18 PROCEDURE — 304965 HCHG RECOVERY SERVICES

## 2018-09-18 PROCEDURE — 700112 HCHG RX REV CODE 229: Performed by: NURSE PRACTITIONER

## 2018-09-18 PROCEDURE — A9270 NON-COVERED ITEM OR SERVICE: HCPCS | Performed by: NURSE PRACTITIONER

## 2018-09-18 PROCEDURE — 59409 OBSTETRICAL CARE: CPT

## 2018-09-18 PROCEDURE — 85027 COMPLETE CBC AUTOMATED: CPT

## 2018-09-18 PROCEDURE — 700111 HCHG RX REV CODE 636 W/ 250 OVERRIDE (IP): Performed by: OBSTETRICS & GYNECOLOGY

## 2018-09-18 PROCEDURE — 3E0234Z INTRODUCTION OF SERUM, TOXOID AND VACCINE INTO MUSCLE, PERCUTANEOUS APPROACH: ICD-10-PCS | Performed by: OBSTETRICS & GYNECOLOGY

## 2018-09-18 RX ORDER — METHYLERGONOVINE MALEATE 0.2 MG/ML
0.2 INJECTION INTRAVENOUS
Status: DISCONTINUED | OUTPATIENT
Start: 2018-09-18 | End: 2018-09-19 | Stop reason: HOSPADM

## 2018-09-18 RX ORDER — VITAMIN A ACETATE, BETA CAROTENE, ASCORBIC ACID, CHOLECALCIFEROL, .ALPHA.-TOCOPHEROL ACETATE, DL-, THIAMINE MONONITRATE, RIBOFLAVIN, NIACINAMIDE, PYRIDOXINE HYDROCHLORIDE, FOLIC ACID, CYANOCOBALAMIN, CALCIUM CARBONATE, FERROUS FUMARATE, ZINC OXIDE, CUPRIC OXIDE 3080; 12; 120; 400; 1; 1.84; 3; 20; 22; 920; 25; 200; 27; 10; 2 [IU]/1; UG/1; MG/1; [IU]/1; MG/1; MG/1; MG/1; MG/1; MG/1; [IU]/1; MG/1; MG/1; MG/1; MG/1; MG/1
1 TABLET, FILM COATED ORAL EVERY MORNING
Status: DISCONTINUED | OUTPATIENT
Start: 2018-09-18 | End: 2018-09-19 | Stop reason: HOSPADM

## 2018-09-18 RX ORDER — DOCUSATE SODIUM 100 MG/1
100 CAPSULE, LIQUID FILLED ORAL 2 TIMES DAILY PRN
Status: DISCONTINUED | OUTPATIENT
Start: 2018-09-18 | End: 2018-09-19 | Stop reason: HOSPADM

## 2018-09-18 RX ORDER — CARBOPROST TROMETHAMINE 250 UG/ML
250 INJECTION, SOLUTION INTRAMUSCULAR
Status: DISCONTINUED | OUTPATIENT
Start: 2018-09-18 | End: 2018-09-19 | Stop reason: HOSPADM

## 2018-09-18 RX ORDER — IBUPROFEN 800 MG/1
800 TABLET ORAL EVERY 8 HOURS PRN
Status: DISCONTINUED | OUTPATIENT
Start: 2018-09-18 | End: 2018-09-19 | Stop reason: HOSPADM

## 2018-09-18 RX ORDER — ACETAMINOPHEN 325 MG/1
650 TABLET ORAL EVERY 4 HOURS PRN
Status: DISCONTINUED | OUTPATIENT
Start: 2018-09-18 | End: 2018-09-19 | Stop reason: HOSPADM

## 2018-09-18 RX ORDER — OXYCODONE HYDROCHLORIDE AND ACETAMINOPHEN 5; 325 MG/1; MG/1
1 TABLET ORAL EVERY 4 HOURS PRN
Status: DISCONTINUED | OUTPATIENT
Start: 2018-09-18 | End: 2018-09-19 | Stop reason: HOSPADM

## 2018-09-18 RX ORDER — OXYCODONE HYDROCHLORIDE AND ACETAMINOPHEN 5; 325 MG/1; MG/1
2 TABLET ORAL EVERY 4 HOURS PRN
Status: DISCONTINUED | OUTPATIENT
Start: 2018-09-18 | End: 2018-09-19 | Stop reason: HOSPADM

## 2018-09-18 RX ORDER — SODIUM CHLORIDE, SODIUM LACTATE, POTASSIUM CHLORIDE, CALCIUM CHLORIDE 600; 310; 30; 20 MG/100ML; MG/100ML; MG/100ML; MG/100ML
INJECTION, SOLUTION INTRAVENOUS PRN
Status: DISCONTINUED | OUTPATIENT
Start: 2018-09-18 | End: 2018-09-19 | Stop reason: HOSPADM

## 2018-09-18 RX ORDER — MISOPROSTOL 200 UG/1
600 TABLET ORAL
Status: DISCONTINUED | OUTPATIENT
Start: 2018-09-18 | End: 2018-09-19 | Stop reason: HOSPADM

## 2018-09-18 RX ORDER — BISACODYL 10 MG
10 SUPPOSITORY, RECTAL RECTAL PRN
Status: DISCONTINUED | OUTPATIENT
Start: 2018-09-18 | End: 2018-09-19 | Stop reason: HOSPADM

## 2018-09-18 RX ORDER — METOCLOPRAMIDE HYDROCHLORIDE 5 MG/ML
10 INJECTION INTRAMUSCULAR; INTRAVENOUS EVERY 6 HOURS PRN
Status: DISCONTINUED | OUTPATIENT
Start: 2018-09-18 | End: 2018-09-19 | Stop reason: HOSPADM

## 2018-09-18 RX ADMIN — OXYCODONE AND ACETAMINOPHEN 2 TABLET: 5; 325 TABLET ORAL at 20:10

## 2018-09-18 RX ADMIN — Medication 1 TABLET: at 07:15

## 2018-09-18 RX ADMIN — Medication 20 UNITS: at 02:45

## 2018-09-18 RX ADMIN — MISOPROSTOL 800 MCG: 200 TABLET ORAL at 04:56

## 2018-09-18 RX ADMIN — DOCUSATE SODIUM 100 MG: 100 CAPSULE, LIQUID FILLED ORAL at 20:10

## 2018-09-18 RX ADMIN — OXYCODONE HYDROCHLORIDE AND ACETAMINOPHEN 1 TABLET: 5; 325 TABLET ORAL at 05:35

## 2018-09-18 RX ADMIN — PNEUMOCOCCAL VACCINE POLYVALENT 25 MCG
25; 25; 25; 25; 25; 25; 25; 25; 25; 25; 25; 25; 25; 25; 25; 25; 25; 25; 25; 25; 25; 25; 25 INJECTION, SOLUTION INTRAMUSCULAR; SUBCUTANEOUS at 17:55

## 2018-09-18 RX ADMIN — IBUPROFEN 800 MG: 800 TABLET, FILM COATED ORAL at 14:45

## 2018-09-18 RX ADMIN — OXYCODONE AND ACETAMINOPHEN 2 TABLET: 5; 325 TABLET ORAL at 10:15

## 2018-09-18 RX ADMIN — IBUPROFEN 800 MG: 800 TABLET, FILM COATED ORAL at 03:25

## 2018-09-18 RX ADMIN — OXYCODONE AND ACETAMINOPHEN 2 TABLET: 5; 325 TABLET ORAL at 14:46

## 2018-09-18 ASSESSMENT — PAIN SCALES - GENERAL
PAINLEVEL_OUTOF10: 3
PAINLEVEL_OUTOF10: 7
PAINLEVEL_OUTOF10: 3
PAINLEVEL_OUTOF10: 0
PAINLEVEL_OUTOF10: 5

## 2018-09-18 ASSESSMENT — PATIENT HEALTH QUESTIONNAIRE - PHQ9
SUM OF ALL RESPONSES TO PHQ9 QUESTIONS 1 AND 2: 0
1. LITTLE INTEREST OR PLEASURE IN DOING THINGS: NOT AT ALL

## 2018-09-18 NOTE — CARE PLAN
Problem: Alteration in comfort related to episiotomy, vaginal repair and/or after birth pains  Goal: Patient is able to ambulate, care for self and infant  Outcome: PROGRESSING AS EXPECTED  Patient having increased pain. Assess every 2 hours, medicate as indicated and give alternative therapies for comfort

## 2018-09-18 NOTE — H&P
History and Physical      Clare Roth is a 42 y.o. year old female  at 38w4d who presents for active labor and SROM.    Subjective:   positive  For CTXS.   positive Feels pain   positive for LOF  negative for vaginal bleeding.   positive for fetal movement    ROS: Pertinent items are noted in HPI.    Past Medical History:   Diagnosis Date   • Arthritis    • Rh negative state in antepartum period, third trimester 2018     Past Surgical History:   Procedure Laterality Date   • OTHER ABDOMINAL SURGERY  age12 and 16    hernia repair     OB History    Para Term  AB Living   18 2 2   15 2   SAB TAB Ectopic Molar Multiple Live Births   3         2      # Outcome Date GA Lbr Ronny/2nd Weight Sex Delivery Anes PTL Lv   18 Current            17 SAB 16 8w0d          16 SAB 14 16w0d             Birth Comments: Meth use, THC use, +Chlamydia and UTI   15 Term 96 39w0d  2.722 kg (6 lb) M Vag-Spont EPI  BRO      Birth Comments: pt states no complications   14 Term 94 39w0d  2.722 kg (6 lb) F Vag-Spont EPI  BRO      Birth Comments: pt states no complications   13 SAB      SAB         Birth Comments: System Generated. Please review and update pregnancy details.   12 AB            11 AB            10 AB            9 AB            8 AB            7 AB            6 AB            5 AB            4 AB            3 AB            2 AB         DEC   1 AB     U            Social History     Social History   • Marital status: Single     Spouse name: N/A   • Number of children: N/A   • Years of education: N/A     Occupational History   • Not on file.     Social History Main Topics   • Smoking status: Current Every Day Smoker     Packs/day: 0.50     Years: 15.00     Types: Cigarettes   • Smokeless tobacco: Never Used   • Alcohol use No   • Drug use: Yes     Types: Inhaled, Marijuana      Comment: some use during pregnancy    • Sexual activity: Yes     Partners: Male     Birth control/  protection: Condom, Surgical      Comment: BTL papers signed     Other Topics Concern   • Not on file     Social History Narrative   • No narrative on file     Allergies: Patient has no known allergies.    Current Facility-Administered Medications:   •  LACTATED RINGERS IV SOLN, , , ,   •  LR infusion, , Intravenous, Continuous, Lena Camarena, A.P.R.N.  •  [START ON 9/18/2018] D5LR infusion, , Intravenous, Continuous, Lean Camarena, A.P.R.N.  •  fentaNYL (SUBLIMAZE) injection 50 mcg, 50 mcg, Intravenous, Q HOUR PRN, Lena Camarena, A.P.R.N.  •  fentaNYL (SUBLIMAZE) injection 100 mcg, 100 mcg, Intravenous, Q HOUR PRN, Lena Camarena, A.P.R.N., 100 mcg at 09/17/18 2250  •  terbutaline (BRETHINE) injection 0.25 mg, 0.25 mg, Intravenous, PRN, Lena Camarena, A.P.R.N.  •  Sod Citrate-Citric Acid (BICITRA) 500-334 MG/5ML solution 30 mL, 30 mL, Oral, Q6HRS PRN, Lena Camarena, A.P.R.N.  •  oxytocin (PITOCIN) injection 10 Units, 10 Units, Intramuscular, Once PRN, Lena Camarena, A.P.R.N.  •  miSOPROStol (CYTOTEC) tablet 800 mcg, 800 mcg, Rectal, Once PRN, Lena Camarena, A.P.R.N.  •  carboPROST (HEMABATE) injection 250 mcg, 250 mcg, Intramuscular, Once PRN, Lena Camarena, A.P.R.N.  •  oxytocin (PITOCIN) 20 UNITS/1000ML LR, , , ,   •  ROPIVACAINE HCL 2 MG/ML INJ SOLN, , , ,     Prenatal care with [facility] starting at 29w4d with following problems:  Patient Active Problem List    Diagnosis Date Noted   • PAP NILM, HPV + - repeat cotesting 1 yr 07/23/2018   • Tobacco use complicating pregnancy, third trimester 07/16/2018   • Marijuana use 07/16/2018   • History of drug abuse - meth use in 2014 07/16/2018   • Advanced maternal age in multigravida, third trimester 07/16/2018   • Pregnancy with adoption planned, currently in third trimester 07/16/2018   • Rh negative state in antepartum period, third trimester 07/16/2018   • Habitual aborter, currently pregnant 07/25/2014         Objective:      Blood pressure 120/79, pulse 76,  "height 1.727 m (5' 8\"), weight 76.7 kg (169 lb), unknown if currently breastfeeding.    General:   no acute distress   Skin:   normal   HEENT:  PERRLA   Lungs:   CTA bilateral   Heart:   S1, S2 normal, no murmur, click, rub or gallop, regular rate and rhythm, brisk carotid upstroke without bruits, peripheral pulses very brisk, chest is clear without rales or wheezing, no pedal edema, no JVD, no hepatosplenomegaly   Abdomen:   gravid, NT   EFW:  9565-6575 g   Pelvis:  Exam deferred., proven to 2722 g   FHTs: 130 BPM + accels - decels moderate variability   Contractions: 4 contractions in 10 min firm to palpation   Uterine Size: S<D   Presentations: Cephalic per RN   Cervix: Per RN    Dilation: 4-5 cm    Effacement: 90%    Station:  -1    Consistency: Soft    Position: Middle     Complete OB US  Normal fetal survey, HUNTER: 6.8    Lab Review  Lab:   Blood type: A     Recent Results (from the past 5880 hour(s))   POCT Urinalysis    Collection Time: 07/16/18  8:28 AM   Result Value Ref Range    POC Color  Negative    POC Appearance  Negative    POC Leukocyte Esterase trace Negative    POC Nitrites negative Negative    POC Urobiligen  Negative (0.2) mg/dL    POC Protein 100g Negative mg/dL    POC Urine PH 6.0 5.0 - 8.0    POC Blood negative Negative    POC Specific Gravity 1.030 <1.005 - >1.030    POC Ketones 15 mg Negative mg/dL    POC Bilirubin  Negative mg/dL    POC Glucose negative Negative mg/dL   POCT Pregnancy    Collection Time: 07/16/18  8:28 AM   Result Value Ref Range    POC Urine Pregnancy Test Positive Negative    Internal Control Positive Positive     Internal Control Negative Negative    THINPREP PAP W/HPV AND CTNG    Collection Time: 07/16/18  9:07 AM   Result Value Ref Range    Cytology Reg See Path Report     Source Other     Source Genital     HPV Genotype 16 POSITIVE (A) Negative    HPV Genotype 18 Negative Negative    HPV Other High Risk Genotypes Negative Negative    C. trachomatis by PCR Negative " Negative    N. gonorrhoeae by PCR Negative Negative   ABO AND RH DETERMINATION    Collection Time: 07/26/18 12:00 AM   Result Value Ref Range    Rh Grouping Only NEG     ABO Grouping Only A    ANTIBODY SCREEN    Collection Time: 07/26/18 12:00 AM   Result Value Ref Range    Antibody Screen Scrn NEG    GLUCOSE 1HR GESTATIONAL    Collection Time: 07/26/18 12:00 AM   Result Value Ref Range    Glucose, Post Dose 141    PLATELET COUNT    Collection Time: 07/26/18 12:00 AM   Result Value Ref Range    Platelet Count 220    RUBELLA ABS IGG    Collection Time: 07/26/18 12:00 AM   Result Value Ref Range    Rubella IgG Antibody 87.40 Immune    HCT    Collection Time: 07/26/18 12:00 AM   Result Value Ref Range    Hematocrit 32.2    HGB    Collection Time: 07/26/18 12:00 AM   Result Value Ref Range    Hemoglobin 10.6    HIV AG/AB COMBO ASSAY SCREENING    Collection Time: 07/26/18 12:00 AM   Result Value Ref Range    HIV 1,0,2 IC Non-reactive    HEP B SURFACE ANTIGEN    Collection Time: 07/26/18 12:00 AM   Result Value Ref Range    Hepatitis B Surface Antigen Negative    T.PALLIDUM AB EIA    Collection Time: 07/26/18 12:00 AM   Result Value Ref Range    Syphilis, Treponemal Qual Non- reactive    GRP B STREP, BY PCR (IRVIN BROTH)    Collection Time: 08/27/18  3:14 PM   Result Value Ref Range    Strep Gp B DNA PCR Negative Negative   POCT NST    Collection Time: 09/14/18  4:51 PM   Result Value Ref Range    NST Indications Borderline low HUNTER     NST Baseline 130s     NST Uterine Activity no     NST Acoustic Stimulation no     NST Assessment Reactive NST with 1 variable deceleration     NST Action Necessary HUNTER check     NST Other Data      NST Return      NST Read By          Assessment:   1.  IUP at 38w4d  2.  Labor status: Active phase labor.  3.  Cat 1 FHTs  4.  Obstetrical history significant for   Patient Active Problem List    Diagnosis Date Noted   • PAP NILM, HPV + - repeat cotesting 1 yr 07/23/2018   • Tobacco use  complicating pregnancy, third trimester 07/16/2018   • Marijuana use 07/16/2018   • History of drug abuse - meth use in 2014 07/16/2018   • Advanced maternal age in multigravida, third trimester 07/16/2018   • Pregnancy with adoption planned, currently in third trimester 07/16/2018   • Rh negative state in antepartum period, third trimester 07/16/2018   • Habitual aborter, currently pregnant 07/25/2014   .      Plan:     Admit to L&D  GBS negative  Routine labs  Pain management prn    Lena Camarena CNM, APRN

## 2018-09-18 NOTE — PROCEDURES
SPONTANEOUS VAGINAL DELIVERY PROCEDURE NOTE    PATIENT ID:  NAME:  Clare Roth  MRN:               [id number]  YOB: 1975    Labor Course: Patient was admitted to Labor and Delivery at 38w5d for active labor and SROM.    Patient received epidural for pain, labor progressed normally without need for augmentation to complete dilation. RT was called for stand by due to meconium staining. Pushing efforts were effective.    On 2018  at 02:34, this 42 y.o., now  38w5d , GBS negative female delivered via  under epidural anesthesia a viable male infant weight pending with APGAR scores of 8 and 9 at one and five minutes. The infant head delivered in GIAN with rotation to ROT. There was a single nuchal cord which was reduced and the anterior shoulder along with the rest of the body delivered uneventfully over an intact perineum. The vigorous infant was placed on mothers abdomen and bulb suctioned at delivery. Because the infant was vigorous and crying, delayed cord clamping occurred. Afterward the cord was doubly clamped by myself and cut by FOB. An intact placenta was delivered spontaneously at 02:42 with 3 vessel cord. Upon vaginal exam, there was sanjuanita labial abrasions that were hemostatic and needed no repair. Estimated blood loss was 250cc. Patient will be transferred to postpartum in stable condition and infant to  nursery.      Lena Camarena CNM, APRN.  Dr. Chance attending physician

## 2018-09-18 NOTE — CARE PLAN
Problem: Knowledge Deficit  Goal: Patient/Support person demonstrates understanding regarding the progression of labor, available options and participates in decision-making process  Outcome: PROGRESSING AS EXPECTED  Teaching performed on admittance for labor, epidural, position changes and delivery. All questions answered, verbalized understanding    Problem: Pain  Goal: Alleviation of Pain or a reduction in pain to the patient's comfort goal    Intervention: Pain Management - Epidural/Spinal  Pt will verbalize want for epidural to rn.

## 2018-09-18 NOTE — PROGRESS NOTES
Assessment done vital signs stable. Patient progressing according to plan of care. Fundus firm at the umbilicus with light lochia. Patient up voiding without difficulty. Ambulating with steady gait. Claims to have good pain relief with p.o medications.Bottle feeding infant on demand. Family at bedside. Patient denies problems at this time. Patient encouraged to call for any needs. Pt claims she will call for medications as needed.

## 2018-09-18 NOTE — DISCHARGE PLANNING
:     Discharge Planning Assessment Post Partum     Reason for Referral: History of drug use, considering adopting out infant.  Address: [address] [place] [address]  Phone: [phone]  Type of Living Situation: living with FOB  Mom Diagnosis: Pregnancy  Baby Diagnosis: [place]  Primary Language: English     Name of Baby: Byron Tsai (: 18)  Father of the Baby: Surjit Tsai (8/3/79)  Involved in baby’s care? Yes  Contact Information: [phone]     Prenatal Care: Yes  Mom's PCP: None  PCP for new baby: Pediatrician list provided     Support System: FOB  Coping/Bonding between mother & baby: Yes  Source of Feeding: bottle feeding  Supplies for Infant: MOB stated she doesn't have supplies for infant because she was planning on doing an adoption.  Provided resources to the [employer] station, [employer] program, [facility] [place], and a packet of additional resources.     Mom's Insurance: Medicaid  Baby Covered on Insurance:Yes  Mother Employed/School: Yes, [employer].  ZENAIDA plans to return to work in 6 weeks.  ALISA works with his father.  Other children in the home/names & ages: 24 year old daughter and 22 year old son.     Financial Hardship/Income: No, both parents are working   Mom's Mental status: A&Ox4  Services used prior to admit: Medicaid.  ZENAIDA would like to apply for [facility]     CPS History: No  Psychiatric History: No  Domestic Violence History: No  Drug/ETOH History: Yes, MOB admits to using marijuana during pregnancy.  Infant's UDS is pending.  MOB stated she last used meth in .     Resources Provided: Pediatrician list, children and family resource list, bag of  supplies, [facility] information  Referrals Made: diaper bank referral provided.      Clearance for Discharge: Waiting on the results of infant's UDS.     Ongoing Plan: Met with ZENAIDA who delivered her third baby.  MOB stated she was planning on doing an adoption with [facility]  [place] but she and FOB have changed their mind.  Asked MOB if she notified the adoption SW.  MOB stated she would like to, however her phone has .  MOB stated she would really like to notify Terra Foley-Adoption SW.  SW offered to call Terra and ask her to call MOB in her room.  MOB was agreeable to that.  LINN called Terra Foley at [phone] and asked her to contact MOB in her room.  Room number provided.  MOB stated both she and FOB are working and are able to get supplies for infant.  Provided MOB with a packet of resources and information to get a car seat, clothes, diapers, crib, and blankets.  Discussed drug history and MOB admits to using marijuana during the pregnancy and last used 3 months ago.  Infant's UDS is pending.  Explained to MOB that if infant's UDS is positive [facility].

## 2018-09-18 NOTE — PROGRESS NOTES
"2215- pt arrived to room c/o contractions and LOF since 2230, noted screaming and crying during ctx, SVE performed, 4-5/90/0, no bag noted, clear/brown discharge noted on pad, TOCO and EFM placed  2245- JON Camarena CNM notified of pt arrival and c/o, received orders for admit and pt may receive epidural when ready  2250- iv started, 18g to left wrist, iv fluids infusing in preparation for epidural per pt request, fentanyl 100mcg given (see mar)  2300-  Dr. eHrnandez called for epidural  2312- Dr. Hernandez at bedside for epidural placement  0030- JON Camarena CNM at bedside, SVE 8/100/+1, pt placed in throne position  0105- pt called out c/o increased vaginal pressure, no change in SVE  0158- pt c/o more pressure, SVE 9/100/+1, placed on side with use of peanut ball  0211- pt states \"I feel like the baby is coming out\", SVE AL/100/+2, repositioned  0214- broken tracing on decel, SVE 10/100/+2, JON Camarena CNM at bedside, set up for delivery  0218- began pushing with RN, mec fluid noted  0234- viable male infant born vaginally with JON Camarena CNM, nuchal x1 reduced, apgars 8/9, infant placed skin to skin, then taken to warmer  0242- placenta delivered spontaneously and intact, pitocin infusing, left labial lac with no repair, infant placed back skin to skin, pt repositioned in bed for comfort, left with spouse at side  0325- pt called out c/o cramping and requesting ibuprofen, ibuprofen 800mg given (see mar)  0500- vaginal bleeding continues to ooze and puddle in bed after fundal rubs, cytotec 800mcg given rectally (see mar), pt tolerated well  0535- vaginal bleeding scant, pt states \"I'm having really bad pain and burning at the site that they repaired my hernia as a child\", percocet 1 tab given (see mar)  0550- up to br, gait steady and strong, voided, self pericare performed, peripad and briefs placed, positioned self in wheelchair, baby in arms, spouse gathering belongings  0615- pt transferred to PPU via wheelchair, report given " to Radha RN

## 2018-09-18 NOTE — PROGRESS NOTES
Patient presents to unit from L&D via wheelchair with infant in arms. Infant placed to bassinet and patient transferred to bed with standby assist.  IV fluids placed to pump per orders, bedside report received from off going RN, plan of care discussed, questions answered and understanding verbalized. Oriented to room, call light, care board, emergency light and dina light.  Wanting Pneumonia vaccination. Plans to keep infant despite previous talk of adopt out.

## 2018-09-19 VITALS
RESPIRATION RATE: 16 BRPM | WEIGHT: 169 LBS | TEMPERATURE: 97.7 F | OXYGEN SATURATION: 97 % | DIASTOLIC BLOOD PRESSURE: 77 MMHG | BODY MASS INDEX: 25.61 KG/M2 | SYSTOLIC BLOOD PRESSURE: 109 MMHG | HEIGHT: 68 IN | HEART RATE: 61 BPM

## 2018-09-19 PROBLEM — O26.893 RH NEGATIVE STATE IN ANTEPARTUM PERIOD, THIRD TRIMESTER: Status: RESOLVED | Noted: 2018-07-16 | Resolved: 2018-09-19

## 2018-09-19 PROBLEM — Z34.93 PREGNANCY WITH ADOPTION PLANNED, CURRENTLY IN THIRD TRIMESTER: Status: RESOLVED | Noted: 2018-07-16 | Resolved: 2018-09-19

## 2018-09-19 PROBLEM — Z67.91 RH NEGATIVE STATE IN ANTEPARTUM PERIOD, THIRD TRIMESTER: Status: RESOLVED | Noted: 2018-07-16 | Resolved: 2018-09-19

## 2018-09-19 PROBLEM — O99.333 TOBACCO USE COMPLICATING PREGNANCY, THIRD TRIMESTER: Status: RESOLVED | Noted: 2018-07-16 | Resolved: 2018-09-19

## 2018-09-19 PROBLEM — O09.523 ADVANCED MATERNAL AGE IN MULTIGRAVIDA, THIRD TRIMESTER: Status: RESOLVED | Noted: 2018-07-16 | Resolved: 2018-09-19

## 2018-09-19 PROCEDURE — 700102 HCHG RX REV CODE 250 W/ 637 OVERRIDE(OP): Performed by: NURSE PRACTITIONER

## 2018-09-19 PROCEDURE — A9270 NON-COVERED ITEM OR SERVICE: HCPCS | Performed by: NURSE PRACTITIONER

## 2018-09-19 RX ORDER — IBUPROFEN 600 MG/1
600 TABLET ORAL EVERY 6 HOURS PRN
Qty: 60 TAB | Refills: 0 | Status: SHIPPED | OUTPATIENT
Start: 2018-09-19 | End: 2018-09-19

## 2018-09-19 RX ORDER — DOCUSATE SODIUM 100 MG/1
100 CAPSULE, LIQUID FILLED ORAL 2 TIMES DAILY
Qty: 60 CAP | Refills: 0 | Status: SHIPPED | OUTPATIENT
Start: 2018-09-19 | End: 2018-09-19

## 2018-09-19 RX ADMIN — Medication 1 TABLET: at 06:00

## 2018-09-19 RX ADMIN — IBUPROFEN 800 MG: 800 TABLET, FILM COATED ORAL at 04:24

## 2018-09-19 RX ADMIN — OXYCODONE AND ACETAMINOPHEN 2 TABLET: 5; 325 TABLET ORAL at 04:24

## 2018-09-19 ASSESSMENT — PAIN SCALES - GENERAL
PAINLEVEL_OUTOF10: 8
PAINLEVEL_OUTOF10: 3

## 2018-09-19 NOTE — DISCHARGE INSTRUCTIONS
POSTPARTUM DISCHARGE INSTRUCTIONS FOR MOM    YOB: 1975   Age: 42 y.o.               Admit Date: 2018     Discharge Date: 2018  Attending Doctor:  RORY Fernandez*                  Allergies:  Patient has no known allergies.    Discharged to home by car. Discharged via wheelchair, hospital escort: Yes.  Special equipment needed: Not Applicable  Belongings with: Personal  Be sure to schedule a follow-up appointment with your primary care doctor or any specialists as instructed.     Discharge Plan:   Diet Plan: Discussed  Activity Level: Discussed  Smoking Cessation Offered: Patient Refused  Confirmed Follow up Appointment: Patient to Call and Schedule Appointment  Confirmed Symptoms Management: Discussed  Medication Reconciliation Updated: Yes  Pneumococcal Vaccine Administered/Refused: Given (See MAR)  Influenza Vaccine Indication: Patient Refuses    REASONS TO CALL YOUR OBSTETRICIAN:  1.   Persistent fever or shaking chills (Temperature higher than 100.4)  2.   Heavy bleeding (soaking more than 1 pad per hour); Passing clots  3.   Foul odor from vagina  4.   Mastitis (Breast infection; breast pain, chills, fever, redness)  5.   Urinary pain, burning or frequency  6.   Episiotomy infection  7.   Abdominal incision infection  8.   Severe depression longer than 24 hours    HAND WASHING  · Prior to handling the baby.  · Before breastfeeding or bottle feeding baby.  · After using the bathroom or changing the baby's diaper.    WOUND CARE  Ask your physician for additional care instructions.  In general:    ·  Incision:      · Keep clean and dry.    · Do NOT lift anything heavier than your baby for up to 6 weeks.    · There should not be any opening or pus.      VAGINAL CARE  · Nothing inside vagina for 6 weeks: no sexual intercourse, tampons or douching.  · Bleeding may continue for 2-4 weeks.  Amount may vary.    · Call your physician for heavy bleeding which means soaking more than  "1 pad per hour    BIRTH CONTROL  · It is possible to become pregnant at any time after delivery and while breastfeeding.  · Plan to discuss a method of birth control with your physician at your follow up visit. visit.    DIET AND ELIMINATION  · Eating more fiber (bran cereal, fruits, and vegetables) and drinking plenty of fluids will help to avoid constipation.  · Urinary frequency after childbirth is normal.    POSTPARTUM BLUES  During the first few days after birth, you may experience a sense of the \"blues\" which may include impatience, irritability or even crying.  These feeling come and go quickly.  However, as many as 1 in 10 women experience emotional symptoms known as postpartum depression.    Postpartum depression:  May start as early as the second or third day after delivery or take several weeks or months to develop.  Symptoms of \"blues\" are present, but are more intense:  Crying spells; loss of appetite; feelings of hopelessness or loss of control; fear of touching the baby; over concern or no concern at all about the baby; little or no concern about your own appearance/caring for yourself; and/or inability to sleep or excessive sleeping.  Contact your physician if you are experiencing any of these symptoms.    Crisis Hotline:  · [place] Crisis Hotline:  1-677-HBPCIQM  Or 1-412.304.2097  · [place] Crisis Hotline:  1-491.753.7287  Or 926-080-9068    PREVENTING SHAKEN BABY:  If you are angry or stressed, PUT THE BABY IN THE CRIB, step away, take some deep breaths, and wait until you are calm to care for the baby.  DO NOT SHAKE THE BABY.  You are not alone, call a supporter for help.    · [facility] Center 24/7 crisis line 617-114-6712 or 1-462.176.2568  · You can also text them, text \"ANSWER\" to 990539    QUIT SMOKING/TOBACCO USE:  I understand the use of any tobacco products increases my chance of suffering from future heart disease and could cause other illnesses which may shorten my life. Quitting the " use of tobacco products is the single most important thing I can do to improve my health. For further information on smoking / tobacco cessation call a Toll Free Quit Line at 1-155.396.3798 (*[employer] [place]) or 1-580.319.8790 (American Lung Association) or you can access the web based program at www.lungusa.org.    · [place] Tobacco Users Help Line:  (842) 861-8658       Toll Free: 1-567.531.5441  · Quit Tobacco Program [facility] (960)596-9842    DEPRESSION / SUICIDE RISK:  As you are discharged from this [facility], it is important to learn how to keep safe from harming yourself.    Recognize the warning signs:  · Abrupt changes in personality, positive or negative- including increase in energy   · Giving away possessions  · Change in eating patterns- significant weight changes-  positive or negative  · Change in sleeping patterns- unable to sleep or sleeping all the time   · Unwillingness or inability to communicate  · Depression  · Unusual sadness, discouragement and loneliness  · Talk of wanting to die  · Neglect of personal appearance   · Rebelliousness- reckless behavior  · Withdrawal from people/activities they love  · Confusion- inability to concentrate     If you or a loved one observes any of these behaviors or has concerns about self-harm, here's what you can do:  · Talk about it- your feelings and reasons for harming yourself  · Remove any means that you might use to hurt yourself (examples: pills, rope, extension cords, firearm)  · Get professional help from the community (Mental Health, Substance Abuse, psychological counseling)  · Do not be alone:Call your Safe Contact- someone whom you trust who will be there for you.  · Call your local CRISIS HOTLINE 321-9710 or 976-345-6435  · Call your local Children's Mobile Crisis Response Team [place] (198) 643-9956 or www.Judobaby  · Call the toll free National Suicide Prevention Hotlines   · National  Suicide Prevention Lifeline 010-068-KUTQ (7486)  · [facility] 800-SUICIDE (988-8984)    DISCHARGE SURVEY:  Thank you for choosing [facility].  We hope we provided you with very good care.  You may be receiving a survey in the mail.  Please fill it out.  Your opinion is valuable to us.    ADDITIONAL EDUCATIONAL MATERIALS GIVEN TO PATIENT:        My signature on this form indicates that:  1.  I have reviewed and understand the above information  2.  My questions regarding this information have been answered to my satisfaction.  3.  I have formulated a plan with my discharge nurse to obtain my prescribed medication for home.

## 2018-09-19 NOTE — CARE PLAN
Problem: Communication  Goal: The ability to communicate needs accurately and effectively will improve  Outcome: PROGRESSING AS EXPECTED  Patient uses call light appropriately

## 2018-09-19 NOTE — PROGRESS NOTES
Patient resting with baby next to her, fundus firm at umbilicus, scant vaginal bleeding, pain tolerable, nipples soft and everted, lungs clear

## 2018-09-19 NOTE — DISCHARGE SUMMARY
Discharge Summary:     Clare Roth   2018    Admitting diagnosis: active labor and SROM    Discharge Date:  2018    Discharge Diagnosis: s/p     Past Medical History:   Diagnosis Date   • Arthritis    • Rh negative state in antepartum period, third trimester 2018     OB History    Para Term  AB Living   18 2 2   15 2   SAB TAB Ectopic Molar Multiple Live Births   3         2      # Outcome Date GA Lbr Ronny/2nd Weight Sex Delivery Anes PTL Lv   18 Current            17 SAB 16 8w0d          16 SAB 14 16w0d             Birth Comments: Meth use, THC use, +Chlamydia and UTI   15 Term 96 39w0d  2.722 kg (6 lb) M Vag-Spont EPI  BRO      Birth Comments: pt states no complications   14 Term 94 39w0d  2.722 kg (6 lb) F Vag-Spont EPI  BRO      Birth Comments: pt states no complications   13 SAB      SAB         Birth Comments: System Generated. Please review and update pregnancy details.   12 AB            11 AB            10 AB            9 AB            8 AB            7 AB            6 AB            5 AB            4 AB            3 AB            2 AB         DEC   1 AB     U              Patient has no known allergies.  Patient Active Problem List    Diagnosis Date Noted   • PAP NILM, HPV + - repeat cotesting 1 yr 2018   • Tobacco use complicating pregnancy, third trimester 2018   • Marijuana use 2018   • History of drug abuse - meth use in 2018   • Advanced maternal age in multigravida, third trimester 2018   • Pregnancy with adoption planned, currently in third trimester 2018   • Rh negative state in antepartum period, third trimester 2018   • Habitual aborter, currently pregnant 2014       Hospital Course:   42 y.o. , now para [address], was admitted with the above mentioned diagnosis, underwent . Patient postpartum course was unremarkable, with progressive advancement in diet , ambulation and  toleration of oral analgesia. Patient without complaints today and desires discharge.     Vitals:    09/18/18 0632 09/18/18 0800 09/18/18 1200 09/18/18 2000   BP: 117/84 121/76 110/75 108/63   Pulse: 95 83 76 63   Resp: 17 18 18 16   Temp: 37 °C (98.6 °F) 36.8 °C (98.2 °F) 36.4 °C (97.6 °F) 36.9 °C (98.4 °F)   TempSrc:       SpO2: 94% 94% 97% 95%   Weight:       Height:         Exam:  Breast exam: negative   plans to bottle feed  Abdomen soft, non-tender. BS normal. No masses or organomegaly    Fundus Firm .yes, Tender .no, Below Umbilicus.yes  perineum intact  Extremities: No edema or tenderness        Labs:    Lab Results   Component Value Date/Time    WBC 12.4 (H) 09/18/2018 11:48 AM    RBC 3.54 (L) 09/18/2018 11:48 AM    HEMOGLOBIN 10.4 (L) 09/18/2018 11:48 AM    HEMATOCRIT 30.9 (L) 09/18/2018 11:48 AM    MCV 87.3 09/18/2018 11:48 AM    MCH 29.4 09/18/2018 11:48 AM    MCHC 33.7 09/18/2018 11:48 AM    MPV 11.3 09/18/2018 11:48 AM      Assessment and Plan:    normal postpartum course  Taking adequate diet and fluids, no heavy bleeding or foul-smelling vaginal d/c, voiding without difficulty, no breast pain    Activity:   Discharge to home once cleared by SW  Pelvic Rest x 6 weeks  Follow up: .[facility]  in 5 weeks for vaginal   Discussed return precautions    Discharge Meds:   Motrin 600 mg , as directed  Colace 100 mg, BID    Dalia Varela MD  Family Medicine, PGY-1

## 2018-09-19 NOTE — CARE PLAN
Problem: Communication  Goal: The ability to communicate needs accurately and effectively will improve  Outcome: PROGRESSING AS EXPECTED  Patient uses call light appropriately, vital signs stable

## 2018-09-19 NOTE — CARE PLAN
Problem: Communication  Goal: The ability to communicate needs accurately and effectively will improve  Outcome: PROGRESSING AS EXPECTED  Patient has been ambulating off unit multiple times, leaving infant in NBN. RN found infant sleeping with MOB couple times, mom educated not to co-sleep with infant, RN offered to place infant in the crib, mom refused!   Patient has been formula feeding infant and changing diapers appropriately.     Problem: Pain Management  Goal: Pain level will decrease to patient's comfort goal  Outcome: PROGRESSING AS EXPECTED  Patient likes to keep PRN pain medication as scheduled but not during sleeping hours.

## 2018-09-19 NOTE — DISCHARGE PLANNING
Action: Baby's UDS was positive for marijuana. LSW called [facility] ([phone]) to report positive UDS to Cristiana Lopez. Cristiana requested more information from MOB. LSW spoke with MOB at bedside re: supplies for baby. MOB stated that she will get a crib and FOB is currently out getting supplies for baby. According to RN, MOB is getting a car seat at 1430 today. LSW called Cristiana Lopez with this additional information and Cristiana stated that she will discuss MOB with her supervisor and MOB is clear to discharge home with baby today.     Barriers to Discharge: None    Plan: No further social work follow-up needed at this time.

## 2018-09-19 NOTE — CARE PLAN
Problem: Safety  Goal: Will remain free from falls  Outcome: PROGRESSING AS EXPECTED  Bed in lowest position

## 2018-09-20 NOTE — PROGRESS NOTES
Patient educated on discharge instructions, patient discharged, but staying room with baby until after circumcision

## 2018-11-02 ENCOUNTER — POST PARTUM (OUTPATIENT)
Dept: OBGYN | Facility: CLINIC | Age: 43
End: 2018-11-02
Payer: MEDICAID

## 2018-11-02 VITALS — DIASTOLIC BLOOD PRESSURE: 68 MMHG | SYSTOLIC BLOOD PRESSURE: 108 MMHG | WEIGHT: 153 LBS | BODY MASS INDEX: 23.26 KG/M2

## 2018-11-02 DIAGNOSIS — R87.810 CERVICAL HIGH RISK HPV (HUMAN PAPILLOMAVIRUS) TEST POSITIVE: ICD-10-CM

## 2018-11-02 PROCEDURE — 90050 PR POSTPARTUM VISIT: CPT | Performed by: NURSE PRACTITIONER

## 2018-11-02 RX ORDER — ACETAMINOPHEN AND CODEINE PHOSPHATE 120; 12 MG/5ML; MG/5ML
1 SOLUTION ORAL DAILY
Qty: 28 TAB | Refills: 6 | Status: ON HOLD | OUTPATIENT
Start: 2018-11-02 | End: 2021-05-31

## 2018-11-02 ASSESSMENT — ENCOUNTER SYMPTOMS
EYES NEGATIVE: 1
MUSCULOSKELETAL NEGATIVE: 1
CONSTITUTIONAL NEGATIVE: 1
NEUROLOGICAL NEGATIVE: 1
PSYCHIATRIC NEGATIVE: 1
RESPIRATORY NEGATIVE: 1
GASTROINTESTINAL NEGATIVE: 1
CARDIOVASCULAR NEGATIVE: 1

## 2018-11-02 NOTE — PROGRESS NOTES
Pt here today for postpartum exam, pt delivered on 9/18/18  Currently bottle feeding  BCM: pt would like BTL consent form signed again today.   LMP: 10/24/18  Good ph: [phone]

## 2018-11-02 NOTE — PROGRESS NOTES
Subjective:      Clare Roth is a 43 y.o. female who presents with No chief complaint on file.            S   44 y/o now  s/p  on 18 of baby boy weighing 6lb 10 oz without complications. No laceration. Now 6 weeks postpartum. Prenatal course significant for no issues. Postpartum course without any complications. Feeling well and happy with baby, denies any severe mood swings or s/sx of postpartum depression and anxiety.     Baby is doing well, formula exclusively.  Has resumed sexual activity with withdrawal method several times about a week or two ago.  Mother reports no issues with bowel or bladder routine, continued regular diet. Bleeding since birth has subsided at this time with ?return to menses short time ago. No vaginal pain/odor/itching, fever, headaches, dizziness/SOB or dysuria. Desires BTL for contraception.     O  See PE: Physical exam today with no abnormal findings  Vital signs WNL: BP and weight   H/H: 12.4>10.4/30.9<202  PAP: NILM on 2018 + HR HPV    A  Reassuring exam of lactating postpartum woman s/p  on 18    P  - Rx POPs for contraception due to high risk age and tobacco use//follow up with us for BTL  - Pregnancy test before surgery   - Resumption of sexual activity: safe sex precautions given  - Counseling on nutrition, adequate hydration, and exercise   -Advised quick start method, missed pills, back up method and need for pregnancy test before BTL procedure   - Counseling on PAP guidelines re: needs colpo due to HR HPV pap NILM as per [facility]   - Warning s/sx of postpartum infection, depression, preeclampsia   - RTC for BTL consult and colpo/establish GYN care   - Referral for mammogram placed             Review of Systems   Constitutional: Negative.    HENT: Negative.    Eyes: Negative.    Respiratory: Negative.    Cardiovascular: Negative.    Gastrointestinal: Negative.    Genitourinary: Negative.    Musculoskeletal: Negative.    Skin: Negative.     Neurological: Negative.    Endo/Heme/Allergies: Negative.    Psychiatric/Behavioral: Negative.           Objective:     /68   Wt 69.4 kg (153 lb)   Breastfeeding? Unknown   BMI 23.26 kg/m²      Physical Exam   Constitutional: She is oriented to person, place, and time. She appears well-developed and well-nourished.   HENT:   Head: Normocephalic.   Eyes: Pupils are equal, round, and reactive to light.   Neck: Normal range of motion. Neck supple. No thyromegaly present.   Cardiovascular: Normal rate and regular rhythm.    Pulmonary/Chest: Effort normal and breath sounds normal. Right breast exhibits no inverted nipple, no mass, no nipple discharge, no skin change and no tenderness. Left breast exhibits no inverted nipple, no mass, no nipple discharge, no skin change and no tenderness. Breasts are symmetrical. There is no breast swelling.   Abdominal: Soft.   Genitourinary: Vagina normal and uterus normal. No breast tenderness, discharge or bleeding. No labial fusion. There is no rash, tenderness, lesion or injury on the right labia. There is no rash, tenderness, lesion or injury on the left labia.   Musculoskeletal: Normal range of motion.   Neurological: She is alert and oriented to person, place, and time.   Skin: Skin is warm and dry.   Psychiatric: She has a normal mood and affect. Her behavior is normal. Judgment and thought content normal.               Assessment/Plan:     There are no diagnoses linked to this encounter.

## 2019-11-07 ENCOUNTER — HOSPITAL ENCOUNTER (EMERGENCY)
Facility: MEDICAL CENTER | Age: 44
End: 2019-11-07

## 2019-11-07 VITALS
HEART RATE: 106 BPM | SYSTOLIC BLOOD PRESSURE: 114 MMHG | BODY MASS INDEX: 21.05 KG/M2 | OXYGEN SATURATION: 100 % | WEIGHT: 138.89 LBS | DIASTOLIC BLOOD PRESSURE: 67 MMHG | TEMPERATURE: 97.5 F | HEIGHT: 68 IN | RESPIRATION RATE: 18 BRPM

## 2019-11-07 PROCEDURE — 302449 STATCHG TRIAGE ONLY (STATISTIC)

## 2019-11-08 ENCOUNTER — HOSPITAL ENCOUNTER (EMERGENCY)
Facility: MEDICAL CENTER | Age: 44
End: 2019-11-08
Attending: EMERGENCY MEDICINE
Payer: MEDICAID

## 2019-11-08 VITALS
SYSTOLIC BLOOD PRESSURE: 99 MMHG | TEMPERATURE: 99.1 F | OXYGEN SATURATION: 99 % | RESPIRATION RATE: 18 BRPM | HEIGHT: 68 IN | BODY MASS INDEX: 20.92 KG/M2 | HEART RATE: 103 BPM | DIASTOLIC BLOOD PRESSURE: 49 MMHG | WEIGHT: 138.01 LBS

## 2019-11-08 DIAGNOSIS — J02.0 STREP THROAT: ICD-10-CM

## 2019-11-08 LAB
FLUAV RNA SPEC QL NAA+PROBE: NEGATIVE
FLUBV RNA SPEC QL NAA+PROBE: NEGATIVE
S PYO AG THROAT QL: ABNORMAL
SIGNIFICANT IND 70042: ABNORMAL
SITE SITE: ABNORMAL
SOURCE SOURCE: ABNORMAL

## 2019-11-08 PROCEDURE — 99284 EMERGENCY DEPT VISIT MOD MDM: CPT

## 2019-11-08 PROCEDURE — A9270 NON-COVERED ITEM OR SERVICE: HCPCS | Performed by: EMERGENCY MEDICINE

## 2019-11-08 PROCEDURE — 700111 HCHG RX REV CODE 636 W/ 250 OVERRIDE (IP): Performed by: EMERGENCY MEDICINE

## 2019-11-08 PROCEDURE — 87502 INFLUENZA DNA AMP PROBE: CPT

## 2019-11-08 PROCEDURE — 87880 STREP A ASSAY W/OPTIC: CPT

## 2019-11-08 PROCEDURE — 700102 HCHG RX REV CODE 250 W/ 637 OVERRIDE(OP): Performed by: EMERGENCY MEDICINE

## 2019-11-08 RX ORDER — PENICILLIN V POTASSIUM 500 MG/1
500 TABLET ORAL ONCE
Status: COMPLETED | OUTPATIENT
Start: 2019-11-08 | End: 2019-11-08

## 2019-11-08 RX ORDER — PENICILLIN V POTASSIUM 500 MG/1
500 TABLET ORAL 4 TIMES DAILY
Qty: 40 TAB | Refills: 0 | Status: ON HOLD | OUTPATIENT
Start: 2019-11-08 | End: 2021-05-31

## 2019-11-08 RX ORDER — DEXAMETHASONE SODIUM PHOSPHATE 10 MG/ML
10 INJECTION, SOLUTION INTRAMUSCULAR; INTRAVENOUS ONCE
Status: COMPLETED | OUTPATIENT
Start: 2019-11-08 | End: 2019-11-08

## 2019-11-08 RX ADMIN — PENICILLIN V POTASIUM 500 MG: 500 TABLET OROPHARYNGEAL at 06:04

## 2019-11-08 RX ADMIN — DEXAMETHASONE SODIUM PHOSPHATE 10 MG: 10 INJECTION INTRAMUSCULAR; INTRAVENOUS at 05:22

## 2019-11-08 NOTE — ED NOTES
Written prescriptions given. PT given DC instructions and states understanding. Denies need for assistance. Stable at DC. NAD noted. Ambulatory with ease.

## 2019-11-08 NOTE — ED TRIAGE NOTES
"Chief Complaint   Patient presents with   • Sore Throat     present for past 2 days, some SOB, fevers, denies any n/v     /76   Pulse (!) 102   Temp 36.1 °C (96.9 °F) (Oral)   Resp 18   Ht 1.727 m (5' 8\")   Wt 62.6 kg (138 lb 0.1 oz)   SpO2 100%   BMI 20.98 kg/m²     Pt arrived w/ above concern, was in ED last night (11/7) but LWBS   "

## 2019-11-08 NOTE — ED TRIAGE NOTES
"Chief Complaint   Patient presents with   • Sore Throat     Reports \"have tonsillitis and feel like its closing off my throat\"     /67   Pulse (!) 106   Temp 36.4 °C (97.5 °F) (Temporal)   Resp 18   Ht 1.727 m (5' 8\")   Wt 63 kg (138 lb 14.2 oz)   SpO2 100%   BMI 21.12 kg/m²     Pt informed of wait times. Educated on triage process.  Asked to return to triage RN for any new or worsening of symptoms. Thanked for patience.  "

## 2019-11-08 NOTE — ED PROVIDER NOTES
ED Provider Note    CHIEF COMPLAINT  Chief Complaint   Patient presents with   • Sore Throat     present for past 2 days, some SOB, fevers, denies any n/v        HPI    Primary care provider: Pcp Pt States None   History obtained from: Patient  History limited by: None     Clare Roth is a 44 y.o. female who presents to the ED complaining of sore throat for 2 days.  She reports that it hurts to swallow and feels that her throat is swollen and closing up.  She reports fever but did not check her temperature.  She denies significant cough.  She had one episode of vomiting today.  She also has had some diarrhea.  She denies dysuria or possibility of pregnancy.  She has not noticed any rash.  No recent foreign travels.    REVIEW OF SYSTEMS  Please see HPI for pertinent positives/negatives.  All other systems reviewed and are negative.     PAST MEDICAL HISTORY  Past Medical History:   Diagnosis Date   • Rh negative state in antepartum period, third trimester 7/16/2018   • Arthritis         SURGICAL HISTORY  Past Surgical History:   Procedure Laterality Date   • OTHER ABDOMINAL SURGERY  age14 and 16    hernia repair        SOCIAL HISTORY  Social History     Tobacco Use   • Smoking status: Current Every Day Smoker     Packs/day: 0.10     Years: 15.00     Pack years: 1.50     Types: Cigarettes   • Smokeless tobacco: Never Used   Substance and Sexual Activity   • Alcohol use: No   • Drug use: Yes     Types: Inhaled, Marijuana     Comment: some use during pregnancy marijuana   • Sexual activity: Yes     Partners: Male     Birth control/protection: Condom, Surgical     Comment: BTL papers signed        FAMILY HISTORY  Family History   Problem Relation Age of Onset   • Cancer Mother         CURRENT MEDICATIONS  Home Medications    **Home medications have not yet been reviewed for this encounter**          ALLERGIES  No Known Allergies     PHYSICAL EXAM  VITAL SIGNS: BP (!) 99/49   Pulse (!) 103   Temp 37.3 °C (99.1  "°F) (Temporal)   Resp 18   Ht 1.727 m (5' 8\")   Wt 62.6 kg (138 lb 0.1 oz)   SpO2 99%   BMI 20.98 kg/m²  @OLAMIDE[[id number]::@     Pulse ox interpretation: 100% I interpret this pulse ox as normal     Constitutional: Well developed, well nourished, alert in no apparent distress, nontoxic appearance    HENT: No external signs of trauma, normocephalic, oropharynx moist with bilateral tonsillar hypertrophy with mild erythema and multiple exudates, uvula is midline, no trismus/drooling/stridor, patient able to speak without hoarseness or difficulty, nose normal    Eyes: PERRL, conjunctiva without erythema, no discharge, no icterus    Neck: Soft and supple, trachea midline, no stridor, mild tenderness anteriorly without crepitus or fullness, no LAD, no JVD, good ROM    Cardiovascular: Tachycardia, no murmurs/rubs/gallops, strong distal pulses and good perfusion    Thorax & Lungs: No respiratory distress, CTAB   Abdomen: Soft, nontender, nondistended, no guarding, no rebound, normal BS    Back: No CVAT    Extremities: No cyanosis, no edema, no gross deformity, good ROM, intact distal pulses with brisk cap refill    Skin: Warm, dry, no pallor/cyanosis, no rash noted    Lymphatic: No lymphadenopathy noted    Neuro: A/O times 3, no focal deficits noted, ambulating without difficulty  Psychiatric: Cooperative      DIAGNOSTIC STUDIES / PROCEDURES        LABS  All labs reviewed by me.     Results for orders placed or performed during the hospital encounter of 11/08/19   RAPID STREP,CULT IF INDICATED   Result Value Ref Range    Significant Indicator POS (POS)     Source THRT     Site THROAT     Rapid Strep Screen Positive for Group A streptococcus. (A)    Influenza A/B By PCR (Adult - Flu Only)   Result Value Ref Range    Influenza virus A RNA Negative Negative    Influenza virus B, PCR Negative Negative        RADIOLOGY  The radiologist's interpretation of all radiological studies have been reviewed by me.     No orders to " display          COURSE & MEDICAL DECISION MAKING  Nursing notes, VS, PMSFHx reviewed in chart.     Review of past medical records shows the patient came to this ED last night but left without being seen.  She was last admitted at [facility] for normal spontaneous vaginal delivery on September 17, 2018 and discharged on September 19, 2018.    Differential diagnoses considered include but are not limited to: Influenza, pharyngitis/tonsillitis, epiglottitis, peritonsillar abscess, retropharyngeal abscess, mononucleosis, viral syndrome       History and physical exam as above.  Patient is positive for strep throat.  Findings discussed with the patient.  Patient is able to tolerate oral fluids in the ED and is noted to be in no acute distress and nontoxic in appearance.  Low clinical suspicion at this time for more serious acute pathology such as sepsis, meningitis, pharyngeal abscess, epiglottitis, pneumonia given the history/exam/findings.  Patient was given the option of Bicillin IM in the ED versus outpatient oral antibiotic and she opted for the latter.  She was started on penicillin.  Patient was also given a dose of Decadron for tonsillar swelling.  I discussed with patient worrisome signs and symptoms and return to ED precautions and she was advised on outpatient follow-up as well as supportive home care including hydration, good hygiene, salt water gargles and using acetaminophen/ibuprofen as needed.  She verbalized understanding and agreed with plan of care with no further questions or concerns.      The patient is referred to a primary physician for blood pressure management, diabetic screening, and for all other preventative health concerns.       FINAL IMPRESSION  1. Strep throat Acute          DISPOSITION  Patient will be discharged home in stable condition.       FOLLOW UP  Please follow-up with your doctor    Call today      [facility], Emergency Dept  [address]  [place] [address]  [phone]    If symptoms worsen         OUTPATIENT MEDICATIONS  Discharge Medication List as of 11/8/2019  6:06 AM      START taking these medications    Details   penicillin v potassium (VEETID) 500 MG Tab Take 1 Tab by mouth 4 times a day., Disp-40 Tab, R-0, Print Rx Paper                Electronically signed by: Brice Hawthorne, 11/8/2019 5:10 AM      Portions of this record were made with voice recognition software.  Despite my review, spelling/grammar/context errors may still remain.  Interpretation of this chart should be taken in this context.

## 2020-07-31 NOTE — CARE PLAN
Problem: Altered physiologic condition related to immediate post-delivery state and potential for bleeding/hemorrhage  Goal: Patient physiologically stable as evidenced by normal lochia, palpable uterine involution and vital signs within normal limits  Outcome: PROGRESSING AS EXPECTED  Assess lochia and fundus every 4 hours. Discussed appropriate amounts of bleeding and when to contact physician         Total Pulses: 1 Procedure Text: The patient's skin was cleaned and the procedure was performed using the parameters noted above. Pulse Duration (In Milliseconds): 3 Skin Type (Optional): II Price (Use Numbers Only, No Special Characters Or $): 400.00 Consent: Written consent obtained, risks reviewed including but not limited to crusting, scabbing, blistering, scarring, darker or lighter pigmentary change, bruising, and/or incomplete response. Detail Level: Zone Fluence: 17 Treated Area: small area Post-Care Instructions: I reviewed with the patient in detail post-care instructions. Patient should stay away from the sun and wear sun protection until treated areas are fully healed. Pulse Delay (In Milliseconds): 0 Fluence Units: J/cm2

## 2021-05-31 ENCOUNTER — APPOINTMENT (OUTPATIENT)
Dept: RADIOLOGY | Facility: MEDICAL CENTER | Age: 46
End: 2021-05-31
Attending: EMERGENCY MEDICINE
Payer: MEDICAID

## 2021-05-31 ENCOUNTER — ANESTHESIA (OUTPATIENT)
Dept: SURGERY | Facility: MEDICAL CENTER | Age: 46
End: 2021-05-31
Payer: MEDICAID

## 2021-05-31 ENCOUNTER — ANESTHESIA EVENT (OUTPATIENT)
Dept: SURGERY | Facility: MEDICAL CENTER | Age: 46
End: 2021-05-31
Payer: MEDICAID

## 2021-05-31 ENCOUNTER — HOSPITAL ENCOUNTER (OUTPATIENT)
Facility: MEDICAL CENTER | Age: 46
End: 2021-05-31
Attending: EMERGENCY MEDICINE | Admitting: FAMILY MEDICINE
Payer: MEDICAID

## 2021-05-31 VITALS
DIASTOLIC BLOOD PRESSURE: 80 MMHG | SYSTOLIC BLOOD PRESSURE: 119 MMHG | RESPIRATION RATE: 16 BRPM | OXYGEN SATURATION: 99 % | TEMPERATURE: 97.9 F | BODY MASS INDEX: 22.69 KG/M2 | WEIGHT: 149.69 LBS | HEART RATE: 80 BPM | HEIGHT: 68 IN

## 2021-05-31 DIAGNOSIS — S82.831C: ICD-10-CM

## 2021-05-31 DIAGNOSIS — S82.831B OTHER FRACTURE OF UPPER AND LOWER END OF RIGHT FIBULA, INITIAL ENCOUNTER FOR OPEN FRACTURE TYPE I OR II: ICD-10-CM
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
## 2021-05-31 PROBLEM — S82.409A FIBULA FRACTURE: Status: ACTIVE | Noted: 2021-05-31

## 2021-05-31 LAB
ALBUMIN SERPL BCP-MCNC: 4.2 G/DL (ref 3.2–4.9)
ALBUMIN/GLOB SERPL: 1.4 G/DL
ALP SERPL-CCNC: 76 U/L (ref 30–99)
ALT SERPL-CCNC: 10 U/L (ref 2–50)
ANION GAP SERPL CALC-SCNC: 12 MMOL/L (ref 7–16)
AST SERPL-CCNC: 12 U/L (ref 12–45)
BASOPHILS # BLD AUTO: 0.3 % (ref 0–1.8)
BASOPHILS # BLD: 0.03 K/UL (ref 0–0.12)
BILIRUB SERPL-MCNC: 0.6 MG/DL (ref 0.1–1.5)
BUN SERPL-MCNC: 19 MG/DL (ref 8–22)
CALCIUM SERPL-MCNC: 9.2 MG/DL (ref 8.4–10.2)
CHLORIDE SERPL-SCNC: 104 MMOL/L (ref 96–112)
CO2 SERPL-SCNC: 24 MMOL/L (ref 20–33)
CREAT SERPL-MCNC: 0.83 MG/DL (ref 0.5–1.4)
EOSINOPHIL # BLD AUTO: 0.05 K/UL (ref 0–0.51)
EOSINOPHIL NFR BLD: 0.5 % (ref 0–6.9)
ERYTHROCYTE [DISTWIDTH] IN BLOOD BY AUTOMATED COUNT: 39.8 FL (ref 35.9–50)
GLOBULIN SER CALC-MCNC: 2.9 G/DL (ref 1.9–3.5)
GLUCOSE SERPL-MCNC: 111 MG/DL (ref 65–99)
HCG SERPL QL: NEGATIVE
HCT VFR BLD AUTO: 39.9 % (ref 37–47)
HGB BLD-MCNC: 13.1 G/DL (ref 12–16)
IMM GRANULOCYTES # BLD AUTO: 0.04 K/UL (ref 0–0.11)
IMM GRANULOCYTES NFR BLD AUTO: 0.4 % (ref 0–0.9)
INR PPP: 1 (ref 0.87–1.13)
LYMPHOCYTES # BLD AUTO: 1.42 K/UL (ref 1–4.8)
LYMPHOCYTES NFR BLD: 15.3 % (ref 22–41)
MCH RBC QN AUTO: 29.5 PG (ref 27–33)
MCHC RBC AUTO-ENTMCNC: 32.8 G/DL (ref 33.6–35)
MCV RBC AUTO: 89.9 FL (ref 81.4–97.8)
MONOCYTES # BLD AUTO: 0.41 K/UL (ref 0–0.85)
MONOCYTES NFR BLD AUTO: 4.4 % (ref 0–13.4)
NEUTROPHILS # BLD AUTO: 7.34 K/UL (ref 2–7.15)
NEUTROPHILS NFR BLD: 79.1 % (ref 44–72)
NRBC # BLD AUTO: 0 K/UL
NRBC BLD-RTO: 0 /100 WBC
PLATELET # BLD AUTO: 242 K/UL (ref 164–446)
PMV BLD AUTO: 10.7 FL (ref 9–12.9)
POTASSIUM SERPL-SCNC: 4.1 MMOL/L (ref 3.6–5.5)
PROT SERPL-MCNC: 7.1 G/DL (ref 6–8.2)
PROTHROMBIN TIME: 12.9 SEC (ref 12–14.6)
RBC # BLD AUTO: 4.44 M/UL (ref 4.2–5.4)
SARS-COV+SARS-COV-2 AG RESP QL IA.RAPID: NOTDETECTED
SODIUM SERPL-SCNC: 140 MMOL/L (ref 135–145)
SPECIMEN SOURCE: NORMAL
WBC # BLD AUTO: 9.3 K/UL (ref 4.8–10.8)

## 2021-05-31 PROCEDURE — 700111 HCHG RX REV CODE 636 W/ 250 OVERRIDE (IP): Performed by: EMERGENCY MEDICINE

## 2021-05-31 PROCEDURE — 700105 HCHG RX REV CODE 258: Performed by: FAMILY MEDICINE

## 2021-05-31 PROCEDURE — A9270 NON-COVERED ITEM OR SERVICE: HCPCS | Performed by: EMERGENCY MEDICINE

## 2021-05-31 PROCEDURE — 80053 COMPREHEN METABOLIC PANEL: CPT

## 2021-05-31 PROCEDURE — 700101 HCHG RX REV CODE 250: Performed by: EMERGENCY MEDICINE

## 2021-05-31 PROCEDURE — 700111 HCHG RX REV CODE 636 W/ 250 OVERRIDE (IP): Performed by: FAMILY MEDICINE

## 2021-05-31 PROCEDURE — 500892 HCHG PACK, PERI-GYN: Performed by: ORTHOPAEDIC SURGERY

## 2021-05-31 PROCEDURE — 160038 HCHG SURGERY MINUTES - EA ADDL 1 MIN LEVEL 2: Performed by: ORTHOPAEDIC SURGERY

## 2021-05-31 PROCEDURE — 700105 HCHG RX REV CODE 258: Performed by: ORTHOPAEDIC SURGERY

## 2021-05-31 PROCEDURE — 700105 HCHG RX REV CODE 258: Performed by: ANESTHESIOLOGY

## 2021-05-31 PROCEDURE — 73564 X-RAY EXAM KNEE 4 OR MORE: CPT | Mod: RT

## 2021-05-31 PROCEDURE — 700102 HCHG RX REV CODE 250 W/ 637 OVERRIDE(OP): Performed by: FAMILY MEDICINE

## 2021-05-31 PROCEDURE — 160009 HCHG ANES TIME/MIN: Performed by: ORTHOPAEDIC SURGERY

## 2021-05-31 PROCEDURE — 99285 EMERGENCY DEPT VISIT HI MDM: CPT

## 2021-05-31 PROCEDURE — 96375 TX/PRO/DX INJ NEW DRUG ADDON: CPT | Mod: XU

## 2021-05-31 PROCEDURE — U0003 INFECTIOUS AGENT DETECTION BY NUCLEIC ACID (DNA OR RNA); SEVERE ACUTE RESPIRATORY SYNDROME CORONAVIRUS 2 (SARS-COV-2) (CORONAVIRUS DISEASE [COVID-19]), AMPLIFIED PROBE TECHNIQUE, MAKING USE OF HIGH THROUGHPUT TECHNOLOGIES AS DESCRIBED BY CMS-2020-01-R: HCPCS

## 2021-05-31 PROCEDURE — 700111 HCHG RX REV CODE 636 W/ 250 OVERRIDE (IP): Performed by: ANESTHESIOLOGY

## 2021-05-31 PROCEDURE — 700101 HCHG RX REV CODE 250: Performed by: ORTHOPAEDIC SURGERY

## 2021-05-31 PROCEDURE — G0378 HOSPITAL OBSERVATION PER HR: HCPCS

## 2021-05-31 PROCEDURE — 85610 PROTHROMBIN TIME: CPT

## 2021-05-31 PROCEDURE — 700105 HCHG RX REV CODE 258: Performed by: EMERGENCY MEDICINE

## 2021-05-31 PROCEDURE — 160035 HCHG PACU - 1ST 60 MINS PHASE I: Performed by: ORTHOPAEDIC SURGERY

## 2021-05-31 PROCEDURE — 501838 HCHG SUTURE GENERAL: Performed by: ORTHOPAEDIC SURGERY

## 2021-05-31 PROCEDURE — 87426 SARSCOV CORONAVIRUS AG IA: CPT

## 2021-05-31 PROCEDURE — 90715 TDAP VACCINE 7 YRS/> IM: CPT | Performed by: EMERGENCY MEDICINE

## 2021-05-31 PROCEDURE — 700101 HCHG RX REV CODE 250: Performed by: ANESTHESIOLOGY

## 2021-05-31 PROCEDURE — 84703 CHORIONIC GONADOTROPIN ASSAY: CPT

## 2021-05-31 PROCEDURE — 85025 COMPLETE CBC W/AUTO DIFF WBC: CPT

## 2021-05-31 PROCEDURE — C9803 HOPD COVID-19 SPEC COLLECT: HCPCS

## 2021-05-31 PROCEDURE — 160002 HCHG RECOVERY MINUTES (STAT): Performed by: ORTHOPAEDIC SURGERY

## 2021-05-31 PROCEDURE — U0005 INFEC AGEN DETEC AMPLI PROBE: HCPCS

## 2021-05-31 PROCEDURE — 160027 HCHG SURGERY MINUTES - 1ST 30 MINS LEVEL 2: Performed by: ORTHOPAEDIC SURGERY

## 2021-05-31 PROCEDURE — 99219 PR INITIAL OBSERVATION CARE,LEVL II: CPT | Performed by: FAMILY MEDICINE

## 2021-05-31 PROCEDURE — 96374 THER/PROPH/DIAG INJ IV PUSH: CPT

## 2021-05-31 PROCEDURE — 304217 HCHG IRRIGATION SYSTEM

## 2021-05-31 PROCEDURE — 160048 HCHG OR STATISTICAL LEVEL 1-5: Performed by: ORTHOPAEDIC SURGERY

## 2021-05-31 PROCEDURE — A9270 NON-COVERED ITEM OR SERVICE: HCPCS | Performed by: FAMILY MEDICINE

## 2021-05-31 PROCEDURE — 96365 THER/PROPH/DIAG IV INF INIT: CPT | Mod: XU

## 2021-05-31 PROCEDURE — 700102 HCHG RX REV CODE 250 W/ 637 OVERRIDE(OP): Performed by: EMERGENCY MEDICINE

## 2021-05-31 PROCEDURE — 36415 COLL VENOUS BLD VENIPUNCTURE: CPT

## 2021-05-31 PROCEDURE — 90471 IMMUNIZATION ADMIN: CPT

## 2021-05-31 RX ORDER — PROMETHAZINE HYDROCHLORIDE 25 MG/1
12.5-25 TABLET ORAL EVERY 4 HOURS PRN
Status: DISCONTINUED | OUTPATIENT
Start: 2021-05-31 | End: 2021-05-31 | Stop reason: HOSPADM

## 2021-05-31 RX ORDER — ONDANSETRON 2 MG/ML
4 INJECTION INTRAMUSCULAR; INTRAVENOUS EVERY 4 HOURS PRN
Status: DISCONTINUED | OUTPATIENT
Start: 2021-05-31 | End: 2021-05-31 | Stop reason: HOSPADM

## 2021-05-31 RX ORDER — SODIUM CHLORIDE, SODIUM LACTATE, POTASSIUM CHLORIDE, CALCIUM CHLORIDE 600; 310; 30; 20 MG/100ML; MG/100ML; MG/100ML; MG/100ML
INJECTION, SOLUTION INTRAVENOUS CONTINUOUS
Status: DISCONTINUED | OUTPATIENT
Start: 2021-05-31 | End: 2021-05-31 | Stop reason: HOSPADM

## 2021-05-31 RX ORDER — HYDROMORPHONE HYDROCHLORIDE 1 MG/ML
0.1 INJECTION, SOLUTION INTRAMUSCULAR; INTRAVENOUS; SUBCUTANEOUS
Status: DISCONTINUED | OUTPATIENT
Start: 2021-05-31 | End: 2021-05-31 | Stop reason: HOSPADM

## 2021-05-31 RX ORDER — SODIUM CHLORIDE, SODIUM LACTATE, POTASSIUM CHLORIDE, CALCIUM CHLORIDE 600; 310; 30; 20 MG/100ML; MG/100ML; MG/100ML; MG/100ML
INJECTION, SOLUTION INTRAVENOUS
Status: DISCONTINUED | OUTPATIENT
Start: 2021-05-31 | End: 2021-05-31 | Stop reason: SURG

## 2021-05-31 RX ORDER — HYDROMORPHONE HYDROCHLORIDE 1 MG/ML
0.4 INJECTION, SOLUTION INTRAMUSCULAR; INTRAVENOUS; SUBCUTANEOUS
Status: DISCONTINUED | OUTPATIENT
Start: 2021-05-31 | End: 2021-05-31 | Stop reason: HOSPADM

## 2021-05-31 RX ORDER — PROMETHAZINE HYDROCHLORIDE 25 MG/1
12.5-25 SUPPOSITORY RECTAL EVERY 4 HOURS PRN
Status: DISCONTINUED | OUTPATIENT
Start: 2021-05-31 | End: 2021-05-31 | Stop reason: HOSPADM

## 2021-05-31 RX ORDER — ACETAMINOPHEN 325 MG/1
650 TABLET ORAL EVERY 6 HOURS PRN
Status: DISCONTINUED | OUTPATIENT
Start: 2021-05-31 | End: 2021-05-31 | Stop reason: HOSPADM

## 2021-05-31 RX ORDER — OXYCODONE HYDROCHLORIDE 5 MG/1
5 TABLET ORAL
Status: DISCONTINUED | OUTPATIENT
Start: 2021-05-31 | End: 2021-05-31 | Stop reason: HOSPADM

## 2021-05-31 RX ORDER — AMOXICILLIN AND CLAVULANATE POTASSIUM 875; 125 MG/1; MG/1
1 TABLET, FILM COATED ORAL 2 TIMES DAILY
Qty: 14 TABLET | Refills: 0 | Status: SHIPPED | OUTPATIENT
Start: 2021-05-31 | End: 2021-06-01 | Stop reason: SDUPTHER

## 2021-05-31 RX ORDER — OXYCODONE HYDROCHLORIDE 10 MG/1
10 TABLET ORAL
Status: DISCONTINUED | OUTPATIENT
Start: 2021-05-31 | End: 2021-05-31 | Stop reason: HOSPADM

## 2021-05-31 RX ORDER — SODIUM CHLORIDE 9 MG/ML
INJECTION, SOLUTION INTRAVENOUS CONTINUOUS
Status: DISCONTINUED | OUTPATIENT
Start: 2021-05-31 | End: 2021-05-31 | Stop reason: HOSPADM

## 2021-05-31 RX ORDER — CLONIDINE HYDROCHLORIDE 0.1 MG/1
0.1 TABLET ORAL EVERY 6 HOURS PRN
Status: DISCONTINUED | OUTPATIENT
Start: 2021-05-31 | End: 2021-05-31 | Stop reason: HOSPADM

## 2021-05-31 RX ORDER — ONDANSETRON 2 MG/ML
4 INJECTION INTRAMUSCULAR; INTRAVENOUS
Status: DISCONTINUED | OUTPATIENT
Start: 2021-05-31 | End: 2021-05-31 | Stop reason: HOSPADM

## 2021-05-31 RX ORDER — LIDOCAINE HYDROCHLORIDE 20 MG/ML
INJECTION, SOLUTION EPIDURAL; INFILTRATION; INTRACAUDAL; PERINEURAL PRN
Status: DISCONTINUED | OUTPATIENT
Start: 2021-05-31 | End: 2021-05-31 | Stop reason: SURG

## 2021-05-31 RX ORDER — PROCHLORPERAZINE EDISYLATE 5 MG/ML
5-10 INJECTION INTRAMUSCULAR; INTRAVENOUS EVERY 4 HOURS PRN
Status: DISCONTINUED | OUTPATIENT
Start: 2021-05-31 | End: 2021-05-31 | Stop reason: HOSPADM

## 2021-05-31 RX ORDER — AMOXICILLIN AND CLAVULANATE POTASSIUM 875; 125 MG/1; MG/1
1 TABLET, FILM COATED ORAL ONCE
Status: COMPLETED | OUTPATIENT
Start: 2021-05-31 | End: 2021-05-31

## 2021-05-31 RX ORDER — KETOROLAC TROMETHAMINE 30 MG/ML
INJECTION, SOLUTION INTRAMUSCULAR; INTRAVENOUS PRN
Status: DISCONTINUED | OUTPATIENT
Start: 2021-05-31 | End: 2021-05-31 | Stop reason: SURG

## 2021-05-31 RX ORDER — HYDROCODONE BITARTRATE AND ACETAMINOPHEN 10; 325 MG/1; MG/1
.5-1 TABLET ORAL EVERY 6 HOURS PRN
Qty: 28 TABLET | Refills: 0 | Status: SHIPPED | OUTPATIENT
Start: 2021-05-31 | End: 2021-06-01 | Stop reason: SDUPTHER

## 2021-05-31 RX ORDER — AMOXICILLIN 250 MG
2 CAPSULE ORAL 2 TIMES DAILY
Status: DISCONTINUED | OUTPATIENT
Start: 2021-06-01 | End: 2021-05-31 | Stop reason: HOSPADM

## 2021-05-31 RX ORDER — POLYETHYLENE GLYCOL 3350 17 G/17G
1 POWDER, FOR SOLUTION ORAL
Status: DISCONTINUED | OUTPATIENT
Start: 2021-05-31 | End: 2021-05-31 | Stop reason: HOSPADM

## 2021-05-31 RX ORDER — LABETALOL HYDROCHLORIDE 5 MG/ML
10 INJECTION, SOLUTION INTRAVENOUS EVERY 4 HOURS PRN
Status: DISCONTINUED | OUTPATIENT
Start: 2021-05-31 | End: 2021-05-31 | Stop reason: HOSPADM

## 2021-05-31 RX ORDER — SUCCINYLCHOLINE/SOD CL,ISO/PF 200MG/10ML
SYRINGE (ML) INTRAVENOUS PRN
Status: DISCONTINUED | OUTPATIENT
Start: 2021-05-31 | End: 2021-05-31 | Stop reason: SURG

## 2021-05-31 RX ORDER — HYDROMORPHONE HYDROCHLORIDE 1 MG/ML
0.2 INJECTION, SOLUTION INTRAMUSCULAR; INTRAVENOUS; SUBCUTANEOUS
Status: DISCONTINUED | OUTPATIENT
Start: 2021-05-31 | End: 2021-05-31 | Stop reason: HOSPADM

## 2021-05-31 RX ORDER — ONDANSETRON 2 MG/ML
INJECTION INTRAMUSCULAR; INTRAVENOUS PRN
Status: DISCONTINUED | OUTPATIENT
Start: 2021-05-31 | End: 2021-05-31 | Stop reason: SURG

## 2021-05-31 RX ORDER — MEPERIDINE HYDROCHLORIDE 25 MG/ML
6.25 INJECTION INTRAMUSCULAR; INTRAVENOUS; SUBCUTANEOUS
Status: DISCONTINUED | OUTPATIENT
Start: 2021-05-31 | End: 2021-05-31 | Stop reason: HOSPADM

## 2021-05-31 RX ORDER — ONDANSETRON 4 MG/1
4 TABLET, ORALLY DISINTEGRATING ORAL EVERY 4 HOURS PRN
Status: DISCONTINUED | OUTPATIENT
Start: 2021-05-31 | End: 2021-05-31 | Stop reason: HOSPADM

## 2021-05-31 RX ORDER — ENALAPRILAT 1.25 MG/ML
1.25 INJECTION INTRAVENOUS EVERY 6 HOURS PRN
Status: DISCONTINUED | OUTPATIENT
Start: 2021-05-31 | End: 2021-05-31 | Stop reason: HOSPADM

## 2021-05-31 RX ORDER — BISACODYL 10 MG
10 SUPPOSITORY, RECTAL RECTAL
Status: DISCONTINUED | OUTPATIENT
Start: 2021-05-31 | End: 2021-05-31 | Stop reason: HOSPADM

## 2021-05-31 RX ORDER — DIPHENHYDRAMINE HYDROCHLORIDE 50 MG/ML
12.5 INJECTION INTRAMUSCULAR; INTRAVENOUS
Status: DISCONTINUED | OUTPATIENT
Start: 2021-05-31 | End: 2021-05-31 | Stop reason: HOSPADM

## 2021-05-31 RX ORDER — HYDROMORPHONE HYDROCHLORIDE 1 MG/ML
0.5 INJECTION, SOLUTION INTRAMUSCULAR; INTRAVENOUS; SUBCUTANEOUS
Status: DISCONTINUED | OUTPATIENT
Start: 2021-05-31 | End: 2021-05-31 | Stop reason: HOSPADM

## 2021-05-31 RX ORDER — DEXAMETHASONE SODIUM PHOSPHATE 4 MG/ML
INJECTION, SOLUTION INTRA-ARTICULAR; INTRALESIONAL; INTRAMUSCULAR; INTRAVENOUS; SOFT TISSUE PRN
Status: DISCONTINUED | OUTPATIENT
Start: 2021-05-31 | End: 2021-05-31 | Stop reason: SURG

## 2021-05-31 RX ORDER — HALOPERIDOL 5 MG/ML
1 INJECTION INTRAMUSCULAR
Status: DISCONTINUED | OUTPATIENT
Start: 2021-05-31 | End: 2021-05-31 | Stop reason: HOSPADM

## 2021-05-31 RX ORDER — HYDROCODONE BITARTRATE AND ACETAMINOPHEN 5; 325 MG/1; MG/1
1 TABLET ORAL ONCE
Status: COMPLETED | OUTPATIENT
Start: 2021-05-31 | End: 2021-05-31

## 2021-05-31 RX ADMIN — WATER 15 ML: 100 IRRIGANT IRRIGATION at 17:15

## 2021-05-31 RX ADMIN — CLOSTRIDIUM TETANI TOXOID ANTIGEN (FORMALDEHYDE INACTIVATED), CORYNEBACTERIUM DIPHTHERIAE TOXOID ANTIGEN (FORMALDEHYDE INACTIVATED), BORDETELLA PERTUSSIS TOXOID ANTIGEN (GLUTARALDEHYDE INACTIVATED), BORDETELLA PERTUSSIS FILAMENTOUS HEMAGGLUTININ ANTIGEN (FORMALDEHYDE INACTIVATED), BORDETELLA PERTUSSIS PERTACTIN ANTIGEN, AND BORDETELLA PERTUSSIS FIMBRIAE 2/3 ANTIGEN 0.5 ML: 5; 2; 2.5; 5; 3; 5 INJECTION, SUSPENSION INTRAMUSCULAR at 12:52

## 2021-05-31 RX ADMIN — Medication 100 MG: at 17:47

## 2021-05-31 RX ADMIN — OXYCODONE HYDROCHLORIDE 10 MG: 10 TABLET ORAL at 19:58

## 2021-05-31 RX ADMIN — HYDROCODONE BITARTRATE AND ACETAMINOPHEN 1 TABLET: 5; 325 TABLET ORAL at 12:52

## 2021-05-31 RX ADMIN — LIDOCAINE HYDROCHLORIDE 100 MG: 20 INJECTION, SOLUTION EPIDURAL; INFILTRATION; INTRACAUDAL; PERINEURAL at 17:47

## 2021-05-31 RX ADMIN — DEXAMETHASONE SODIUM PHOSPHATE 8 MG: 4 INJECTION, SOLUTION INTRAMUSCULAR; INTRAVENOUS at 17:51

## 2021-05-31 RX ADMIN — PROPOFOL 150 MG: 10 INJECTION, EMULSION INTRAVENOUS at 17:47

## 2021-05-31 RX ADMIN — ONDANSETRON 4 MG: 2 INJECTION INTRAMUSCULAR; INTRAVENOUS at 17:57

## 2021-05-31 RX ADMIN — HYDROMORPHONE HYDROCHLORIDE 0.5 MG: 1 INJECTION, SOLUTION INTRAMUSCULAR; INTRAVENOUS; SUBCUTANEOUS at 16:21

## 2021-05-31 RX ADMIN — AMPICILLIN AND SULBACTAM 3 G: 2; 1 INJECTION, POWDER, FOR SOLUTION INTRAVENOUS at 15:05

## 2021-05-31 RX ADMIN — AMOXICILLIN AND CLAVULANATE POTASSIUM 1 TABLET: 875; 125 TABLET, FILM COATED ORAL at 12:52

## 2021-05-31 RX ADMIN — KETOROLAC TROMETHAMINE 30 MG: 30 INJECTION, SOLUTION INTRAMUSCULAR at 18:08

## 2021-05-31 RX ADMIN — TETRACAINE HCL 3 ML: 10 INJECTION SUBARACHNOID at 12:53

## 2021-05-31 RX ADMIN — SODIUM CHLORIDE, POTASSIUM CHLORIDE, SODIUM LACTATE AND CALCIUM CHLORIDE: 600; 310; 30; 20 INJECTION, SOLUTION INTRAVENOUS at 17:15

## 2021-05-31 RX ADMIN — SODIUM CHLORIDE, POTASSIUM CHLORIDE, SODIUM LACTATE AND CALCIUM CHLORIDE: 600; 310; 30; 20 INJECTION, SOLUTION INTRAVENOUS at 17:42

## 2021-05-31 RX ADMIN — FENTANYL CITRATE 100 MCG: 50 INJECTION, SOLUTION INTRAMUSCULAR; INTRAVENOUS at 17:47

## 2021-05-31 RX ADMIN — SODIUM CHLORIDE: 9 INJECTION, SOLUTION INTRAVENOUS at 15:48

## 2021-05-31 ASSESSMENT — COGNITIVE AND FUNCTIONAL STATUS - GENERAL
WALKING IN HOSPITAL ROOM: A LITTLE
CLIMB 3 TO 5 STEPS WITH RAILING: A LITTLE
DRESSING REGULAR UPPER BODY CLOTHING: A LITTLE
SUGGESTED CMS G CODE MODIFIER DAILY ACTIVITY: CK
MOBILITY SCORE: 18
MOVING FROM LYING ON BACK TO SITTING ON SIDE OF FLAT BED: A LITTLE
HELP NEEDED FOR BATHING: A LITTLE
STANDING UP FROM CHAIR USING ARMS: A LITTLE
SUGGESTED CMS G CODE MODIFIER MOBILITY: CK
DRESSING REGULAR LOWER BODY CLOTHING: A LITTLE
PERSONAL GROOMING: A LITTLE
TURNING FROM BACK TO SIDE WHILE IN FLAT BAD: A LITTLE
TOILETING: A LITTLE
MOVING TO AND FROM BED TO CHAIR: A LITTLE
DAILY ACTIVITIY SCORE: 18
EATING MEALS: A LITTLE

## 2021-05-31 ASSESSMENT — LIFESTYLE VARIABLES
HAVE PEOPLE ANNOYED YOU BY CRITICIZING YOUR DRINKING: NO
AVERAGE NUMBER OF DAYS PER WEEK YOU HAVE A DRINK CONTAINING ALCOHOL: 0
TOTAL SCORE: 0
TOTAL SCORE: 0
EVER HAD A DRINK FIRST THING IN THE MORNING TO STEADY YOUR NERVES TO GET RID OF A HANGOVER: NO
TOTAL SCORE: 0
ON A TYPICAL DAY WHEN YOU DRINK ALCOHOL HOW MANY DRINKS DO YOU HAVE: 0
HAVE YOU EVER FELT YOU SHOULD CUT DOWN ON YOUR DRINKING: NO
HOW MANY TIMES IN THE PAST YEAR HAVE YOU HAD 5 OR MORE DRINKS IN A DAY: 0
CONSUMPTION TOTAL: NEGATIVE
EVER FELT BAD OR GUILTY ABOUT YOUR DRINKING: NO
ALCOHOL_USE: NO

## 2021-05-31 ASSESSMENT — PATIENT HEALTH QUESTIONNAIRE - PHQ9
2. FEELING DOWN, DEPRESSED, IRRITABLE, OR HOPELESS: NOT AT ALL
SUM OF ALL RESPONSES TO PHQ9 QUESTIONS 1 AND 2: 0
1. LITTLE INTEREST OR PLEASURE IN DOING THINGS: NOT AT ALL

## 2021-05-31 ASSESSMENT — PAIN DESCRIPTION - PAIN TYPE
TYPE: ACUTE PAIN
TYPE: ACUTE PAIN;SURGICAL PAIN
TYPE: SURGICAL PAIN
TYPE: SURGICAL PAIN

## 2021-05-31 ASSESSMENT — ENCOUNTER SYMPTOMS
FEVER: 0
CHILLS: 0
COUGH: 0
SHORTNESS OF BREATH: 0
ROS SKIN COMMENTS: DOG BITE
VOMITING: 0
NAUSEA: 0

## 2021-05-31 ASSESSMENT — FIBROSIS 4 INDEX: FIB4 SCORE: 0.67

## 2021-05-31 NOTE — ED NOTES
Pt requested to see child prior to starting IV and giving abx. Pt is visiting with child now and will let RN now when she is mentally able to handle IV and blood work.

## 2021-05-31 NOTE — ASSESSMENT & PLAN NOTE
- Received Unasyn in the ER, will continue  - Received tetanus shot  - Deferred rabies vaccination, would like to pursue finding the dog with animal control.  Labs pending

## 2021-05-31 NOTE — ED NOTES
Attempted to clean multiple puncture sites to Right Knee.  Pt states is terrified of needles and does not want any procedures done.  Drsg applied to Right Knee.

## 2021-05-31 NOTE — PROGRESS NOTES
Pt is sending for surgery via [facility] with [facility] escort.

## 2021-05-31 NOTE — CARE PLAN
The patient is Stable - Low risk of patient condition declining or worsening    Shift Goals  Clinical Goals: pain control and for surgery    Progress made toward(s) clinical / shift goals:  Pt will decrease pain and medicated per MAR.              Educated for upcoming surgery.    Patient is not progressing towards the following goals:      Problem: Pain - Standard  Goal: Alleviation of pain or a reduction in pain to the patient’s comfort goal  Outcome: Progressing     Problem: Knowledge Deficit - Standard  Goal: Patient and family/care givers will demonstrate understanding of plan of care, disease process/condition, diagnostic tests and medications  Outcome: Progressing     Problem: Fall Risk  Goal: Patient will remain free from falls  Outcome: Progressing

## 2021-05-31 NOTE — ED NOTES
Med rec completed per Pt at bedside.  Allergies reviewed with Pt. No known drug allergies.  Pt denies taking any prescription medications. No vitamins/supplements. No recent over-the-counter medications.  No oral antibiotics in last 14 days.  Pt's preferred pharmacy: [facility] on [address]

## 2021-05-31 NOTE — ED PROVIDER NOTES
ED Provider Note    Chief Complaint:   Dog bite    HPI:  Clare Roth is a very pleasant 45-year-old woman who presents to the emergency department for evaluation of a dog bite to the posterior aspect of the right knee.  She states that she was walking past a park when there was a scuffle, and a large number of dogs ran near her.  During the scuffle she was bit to the posterior aspect of the right knee, this was reportedly unprovoked.  The dogs were taken away by their respective owners, however she is uncertain as to the specific dog that bit her.  This was witnessed, and they were provided with a dog bite form from [facility].  She does have some localized pain and swelling to the area affected by the bite, she is able to easily flex and extend the knee.  She reports no other injuries at this time.  She has multiple lacerations and puncture wounds to the posterior and lateral aspect of the right knee.     Review of Systems:  See HPI for pertinent positives and negatives.    Past Medical History:   has a past medical history of Arthritis and Rh negative state in antepartum period, third trimester (7/16/2018).    Social History:  Social History     Tobacco Use   • Smoking status: Current Every Day Smoker     Packs/day: 0.10     Years: 15.00     Pack years: 1.50     Types: Cigarettes   • Smokeless tobacco: Never Used   Vaping Use   • Vaping Use: Never used   Substance and Sexual Activity   • Alcohol use: No   • Drug use: Yes     Types: Inhaled, Marijuana     Comment: sunil   • Sexual activity: Yes     Partners: Male     Birth control/protection: Condom, Surgical     Comment: BTL papers signed       Surgical History:   has a past surgical history that includes other abdominal surgery (age14 and 16).    Current Medications:  Home Medications    **Home medications have not yet been reviewed for this encounter**         Allergies:  No Known Allergies    Physical Exam:  Vital Signs: /85   Pulse 89   " Temp 36.5 °C (97.7 °F) (Temporal)   Resp 20   Ht 1.727 m (5' 8\")   Wt 67.9 kg (149 lb 11.1 oz)   SpO2 100%   BMI 22.76 kg/m²   Constitutional: Alert, no acute distress  HENT: Atraumatic  Neck: Normal range of motion  Cardiovascular: Right lower extremity warm, well perfused  Pulmonary: Normal work of breathing  Skin: Right lower extremity with multiple puncture wounds and superficial lacerations, no larger than 1 cm, localized to the lateral aspect of the knee as well as the posterior aspect of the knee  Musculoskeletal: Right knee with full range of motion, mild overlying soft tissue swelling, lacerations and puncture wounds as documented above  Neurologic: Right lower extremity with normal sensory and motor function    Medical records reviewed for continuity of care.  No recent visits for similar symptoms.    Labs:  Labs Reviewed   CBC WITH DIFFERENTIAL   COMP METABOLIC PANEL   HCG QUAL SERUM       Radiology:  DX-KNEE COMPLETE 4+ RIGHT   Final Result      1.  No radiopaque foreign body is identified.   2.  Fracture of the lateral aspect of the fibula with an overlying soft tissue injury.   3.  Small ovoid lucency within the distal lateral femur may represent a small ovoid lesion as opposed to bony injury as no overlying soft tissue swelling is seen. Follow-up of this lesion is recommended.              ED Medications Administered:  Medications   ampicillin/sulbactam (UNASYN) 3 g in  mL IVPB (has no administration in time range)   lidocaine-epinephrine-tetracaine (LET) topical soln 3 mL (3 mL Topical Given 5/31/21 1253)   HYDROcodone-acetaminophen (NORCO) 5-325 MG per tablet 1 tablet (1 tablet Oral Given 5/31/21 1252)   tetanus-dipth-acell pertussis (ADACEL) inj 0.5 mL (0.5 mL Intramuscular Given 5/31/21 1252)   amoxicillin-clavulanate (AUGMENTIN) 875-125 MG per tablet 1 tablet (1 tablet Oral Given 5/31/21 1252)     MDM:  Ms. Roth presents to the emergency department today for evaluation of a dog " bite to the right knee as documented above.  The dog that bit her is unknown at this time, though she does report that it was at a park with several other dogs, and was taken away by its owner.  Vaccination status is uncertain.  She reports her tetanus vaccination was not updated within the past 5 years, this was updated today.  She is provided with a dose of Augmentin in the emergency department, and will be discharged with a prescription for the same.    I did discuss the option of rabies prophylaxis, she states that she recalls where the event took place, and also recalls that the dog's owner was likely on site as the dogs were all taken away after the bite.  I discussed that rabies is 100% fatal, but also that prophylaxis may be avoided if the dog can be identified.  It sounds as though this was a lower risk bite, and that the dog and owner may be identifiable with the assistance of animal control.  She is counseled to speak with them tomorrow, and return to the emergency department for consideration of prophylaxis if the dog cannot be identified, observed, nor vaccination status confirmed.  She declines rabies prophylaxis at this time.    Plain film demonstrates no retained foreign bodies.  There is a fracture of the lateral aspect of the fibula with overlying soft tissue injury.    Wounds were anesthetized with let, and irrigated.      I discussed this patient's presentation with Dr. Vegas, who recommends washout in the OR due to high risk of infection and open fracture.  Plan is time is for admission to medicine service, Unasyn administered in the emergency department.  After my initial assessment, her family member had brought her food and a smoothie while she was in the emergency department, n.p.o. time begins at 1:30 pm.    Personal protective equipment including N95 surgical respirator, goggles, and gloves were used during this encounter.       Disposition:  Admit to hospitalist service with orthopedics  following    Final Impression:  1. Dog bite, initial encounter    2. Type III open fracture of proximal end of right fibula, unspecified fracture morphology, initial encounter        Electronically signed by: Radha iBggs MD, 5/31/2021 1:38 PM

## 2021-05-31 NOTE — H&P
Hospital Medicine History & Physical Note    Date of Service  5/31/2021    Primary Care Physician  Pcp Pt States None    Consultants  Dr Vegas     Code Status  Full Code    Chief Complaint  Chief Complaint   Patient presents with   • Dog Bite     Reports dog bite approx 1h PTA to R posterior knee while at a park. Gauze applied and bleeding controlled. Circulation and sensation intact.        History of Presenting Illness  45 y.o. female who presented 5/31/2021 with no significant medical history who was walking past a park today when two dogs began to fight and she was bit in the posterior calf.  She doesn't remember any of the details, but ERP discussed rabies prophylaxis with the patient and she has declined at this time, wishing to discuss finding the dog with animal control first.She was given tetanus in the ER, and was given a tetanus shot. Labs are pending.     Review of Systems  Review of Systems   Constitutional: Negative for chills and fever.   Respiratory: Negative for cough and shortness of breath.    Cardiovascular: Negative for chest pain and leg swelling.   Gastrointestinal: Negative for nausea and vomiting.   Skin:        Dog bite         Past Medical History   has a past medical history of Arthritis and Rh negative state in antepartum period, third trimester (7/16/2018).    Surgical History   has a past surgical history that includes other abdominal surgery (age14 and 16).     Family History  family history includes Cancer in her mother.     Social History   reports that she has been smoking cigarettes. She has a 1.50 pack-year smoking history. She has never used smokeless tobacco. She reports current drug use. Drugs: Inhaled and Marijuana. She reports that she does not drink alcohol.    Allergies  No Known Allergies    Medications  Prior to Admission Medications   Prescriptions Last Dose Informant Patient Reported? Taking?   norethindrone (MICRONOR) 0.35 MG tablet NOT TAKING at NOT TAKING Patient  No No   Sig: Take 1 Tab by mouth every day.   Patient not taking: Reported on 5/31/2021   penicillin v potassium (VEETID) 500 MG Tab NOT TAKING at NOT TAKING Patient No No   Sig: Take 1 Tab by mouth 4 times a day.   Patient not taking: Reported on 5/31/2021      Facility-Administered Medications: None       Physical Exam  Temp:  [36.5 °C (97.7 °F)] 36.5 °C (97.7 °F)  Pulse:  [89] 89  Resp:  [20] 20  BP: (117)/(85) 117/85  SpO2:  [100 %] 100 %    Physical Exam  Vitals and nursing note reviewed.   Constitutional:       Appearance: Normal appearance.   HENT:      Mouth/Throat:      Mouth: Mucous membranes are moist.   Cardiovascular:      Rate and Rhythm: Normal rate and regular rhythm.   Pulmonary:      Effort: Pulmonary effort is normal. No respiratory distress.      Breath sounds: Normal breath sounds. No wheezing.   Abdominal:      General: Abdomen is flat. Bowel sounds are normal.      Palpations: Abdomen is soft.   Musculoskeletal:         General: Swelling (R foot) present. Normal range of motion.   Skin:     Comments: Multiple puncture wounds to posterior of R calf   Neurological:      General: No focal deficit present.      Mental Status: She is alert and oriented to person, place, and time.   Psychiatric:         Behavior: Behavior normal.         Laboratory:          No results for input(s): ALTSGPT, ASTSGOT, ALKPHOSPHAT, TBILIRUBIN, DBILIRUBIN, GAMMAGT, AMYLASE, LIPASE, ALB, PREALBUMIN, GLUCOSE in the last 72 hours.      No results for input(s): NTPROBNP in the last 72 hours.      No results for input(s): TROPONINT in the last 72 hours.    Imaging:  DX-KNEE COMPLETE 4+ RIGHT   Final Result      1.  No radiopaque foreign body is identified.   2.  Fracture of the lateral aspect of the fibula with an overlying soft tissue injury.   3.  Small ovoid lucency within the distal lateral femur may represent a small ovoid lesion as opposed to bony injury as no overlying soft tissue swelling is seen. Follow-up of this  lesion is recommended.               Assessment/Plan:  I anticipate this patient is appropriate for observation status at this time.    * Fibula fracture  Assessment & Plan  - Pt bitten by a dog with subsequent fibula fracture seen on imaging  - To go to the OR with Dr Vegas but ate at 2pm, keep NPO for now      Dog bite  Assessment & Plan  - Received Unasyn in the ER, will continue  - Received tetanus shot  - Deferred rabies vaccination, would like to pursue finding the dog with animal control.  Labs pending

## 2021-05-31 NOTE — PROGRESS NOTES
Received report from ER RN. Assumed pt care. Assessment and chart check complete. Pt is AAOX4, no signs of distress, denies N/V and headache. Pt pain currently 7/10, medicated per MAR. VSS. CMS intact. Fall precautions in place, treaded socks on pt, bed in low position. Call light within reach. Educated pt to call if needing anything.

## 2021-05-31 NOTE — PROGRESS NOTES
2 RN skin assessment done; skin not WDL. Multiple lacerations and puncture wounds to the posterior and lateral aspect of the right knee.

## 2021-05-31 NOTE — ASSESSMENT & PLAN NOTE
- Pt bitten by a dog with subsequent fibula fracture seen on imaging  - To go to the OR with Dr Vegas but ate at 2pm, keep NPO for now

## 2021-06-01 LAB
SARS-COV-2 RNA RESP QL NAA+PROBE: NOTDETECTED
SPECIMEN SOURCE: NORMAL

## 2021-06-01 RX ORDER — AMOXICILLIN AND CLAVULANATE POTASSIUM 875; 125 MG/1; MG/1
1 TABLET, FILM COATED ORAL 2 TIMES DAILY
Qty: 14 TABLET | Refills: 0 | Status: SHIPPED | OUTPATIENT
Start: 2021-06-01 | End: 2021-06-08

## 2021-06-01 RX ORDER — HYDROCODONE BITARTRATE AND ACETAMINOPHEN 10; 325 MG/1; MG/1
.5-1 TABLET ORAL EVERY 6 HOURS PRN
Qty: 28 TABLET | Refills: 0 | Status: SHIPPED | OUTPATIENT
Start: 2021-06-01 | End: 2021-06-08

## 2021-06-01 ASSESSMENT — PAIN SCALES - GENERAL: PAIN_LEVEL: 7

## 2021-06-01 NOTE — ANESTHESIA PROCEDURE NOTES
Airway    Date/Time: 5/31/2021 5:47 PM  Performed by: Deshawn Crocker M.D.  Authorized by: Deshawn Crocker M.D.     Location:  OR  Urgency:  Elective  Indications for Airway Management:  Anesthesia      Spontaneous Ventilation: absent    Sedation Level:  Deep  Preoxygenated: Yes    Patient Position:  Sniffing  Mask Difficulty Assessment:  0 - not attempted  Final Airway Type:  Endotracheal airway  Final Endotracheal Airway:  ETT  Cuffed: Yes    Technique Used for Successful ETT Placement:  Direct laryngoscopy    Insertion Site:  Oral  Blade Type:  Saravanan  Laryngoscope Blade/Videolaryngoscope Blade Size:  3  ETT Size (mm):  7.0  Measured from:  Teeth  ETT to Teeth (cm):  21  Placement Verified by: auscultation and capnometry    Cormack-Lehane Classification:  Grade I - full view of glottis  Number of Attempts at Approach:  1

## 2021-06-01 NOTE — CONSULTS
DATE OF SERVICE:  05/31/2021     ORTHOPEDIC CONSULTATION     REQUESTING PHYSICIAN:  Radha Biggs MD, emergency department.     REASON FOR CONSULTATION:  Right open proximal fibula fracture from dog bite   wound.     CHIEF COMPLAINT:  Right knee pain.     HISTORY OF PRESENT ILLNESS:  The patient is a 45-year-old female.  She is not   sure exactly what happened, but she was bit on the back of her right knee, she   thinks by a pit bull.  She was wearing some quintin capris over that area.  She   denies numbness or paresthesias distally in her extremities.  She denies   other injuries.     ALLERGIES:  No known drug allergies.     OUTPATIENT MEDICATIONS:  None.     PAST MEDICAL DIAGNOSIS:  None.     PAST SURGICAL HISTORY:  Inguinal herniorrhaphy, bilateral.     SOCIAL HISTORY:  The patient does smoke marijuana and smokes cigarettes.  She   states that she did a line of cocaine a couple days ago, but denies routine   illicit drug use.     PHYSICAL EXAMINATION:  VITAL SIGNS:  Temperature is 98.2, heart rate 79, respiratory rate 18, blood   pressure 107/73, pulse oximetry 100% on room air.  GENERAL APPEARANCE:  The patient is in no acute distress.  She is following   commands.  HEAD, EYES, EARS, NOSE AND THROAT:  Normocephalic, atraumatic.  Mucous   membranes moist.  PULMONARY:  Symmetric unlabored breathing.  CARDIOVASCULAR:  Extremities well perfused.  ABDOMEN:  Not obviously distended.  MUSCULOSKELETAL:  Right lower extremity, she has multiple 1 cm   laceration/puncture wounds to the lateral and posterior proximal leg.  There   is no active bleeding present.  She is able to dorsi and plantarflex the foot,   and flex and extend her toes.  She has sensation intact to light touch in the   foot with palpable dorsalis pedis and posterior tibial pulses as well.     DIAGNOSTIC IMAGING:  Plain x-rays of the right knee shows a fracture of the   proximal fibula and soft tissue injury to the lateral aspect of the knee   joint.   There is no evidence of a joint effusion present.     ASSESSMENT:  A 45-year-old female with a dog bite wound to the right knee area   with an open proximal fibula fracture.  Given the location of the wound, I   feel it is very unlikely that she has a traumatic knee arthrotomy, but we can   explore this intraoperatively to be sure.     RECOMMENDATIONS:  1.  I discussed these findings with the patient and I feel that she would   benefit from formal surgical wound exploration, debridement of her open   fracture to minimize the risk of infection and I recommend doing this in the   operating room.  2.  She has received prophylactic antibiotics, specifically Unasyn in the   emergency department and I will make preparations to get her to the operating   room for surgical management this evening.  3.  I do anticipate that she can likely be discharged to home afterwards on   oral antibiotic prophylaxis.        ______________________________  MD KAYLIN Pulido/[facility]    DD:  05/31/2021 17:42  DT:  05/31/2021 18:12    Job#:  [id number]

## 2021-06-01 NOTE — OR NURSING
Patient to preop, allergies and NPO status verified, home medications reconciled, belongings secured, verbalizes understanding of pain scale, surgical site verified by RN and cleaned and prepped by CNA, IV access established, SCDs in place, triple aim completed.

## 2021-06-01 NOTE — ANESTHESIA PREPROCEDURE EVALUATION
Relevant Problems   NEURO   (positive) History of drug abuse - meth use in 2014       Physical Exam    Airway   Mallampati: II  TM distance: >3 FB  Neck ROM: full       Cardiovascular - normal exam  Rhythm: regular  Rate: normal  (-) murmur     Dental - normal exam             Pulmonary - normal exam  Breath sounds clear to auscultation     Abdominal    Neurological - normal exam                 Anesthesia Plan    ASA 2- EMERGENT       Plan - general       Airway plan will be ETT          Induction: intravenous    Postoperative Plan: Postoperative administration of opioids is intended.    Pertinent diagnostic labs and testing reviewed    Informed Consent:    Anesthetic plan and risks discussed with patient.    Use of blood products discussed with: patient whom consented to blood products.

## 2021-06-01 NOTE — PROGRESS NOTES
Pt discharged to home by car with pt's brother. Discharge instructions gone over with pt. Instructions packet w/ pt. IV removed. Pt medicated for 7/10 pain.  Personal belongings accounted for and with pt. Arm band removed and placed in shredding box. Crutches given to pt and instructions gone over. Pt demonstrated correct use. Pt voided. Dressing CD&I. Wound care gone over with pt. Wound care supplies given to pt. All questions and concerns addressed.

## 2021-06-01 NOTE — DISCHARGE SUMMARY
Discharge Summary    CHIEF COMPLAINT ON ADMISSION  Chief Complaint   Patient presents with   • Dog Bite     Reports dog bite approx 1h PTA to R posterior knee while at a park. Gauze applied and bleeding controlled. Circulation and sensation intact.        Reason for Admission  Leg Laceration     Admission Date  5/31/2021    CODE STATUS  Full Code    HPI & HOSPITAL COURSE  45 y.o. female who presented 5/31/2021 with no significant medical history who was walking past a park today when two dogs began to fight and she was bit in the posterior calf.  She doesn't remember any of the details, but ERP discussed rabies prophylaxis with the patient and she has declined at this time, wishing to discuss finding the dog with animal control first.She was given tetanus in the ER, and was given a tetanus shot.  She was noted to have an associated fibula fracture on imaging.    Dr Vegas took the patient to the OR and performed a washout/debridement of the wound and has sent in an Rx for Augmentin, and has cleared the pt for discharge from his standpoint, with follow up .  She is hemodynamically stable for discharge and is to follow up with Dr Vegas in 7-10 days for a wound check with WBAT.       Therefore, she is discharged in good and stable condition to home with close outpatient follow-up.    The patient recovered much more quickly than anticipated on admission.    Discharge Date  5/31/21    FOLLOW UP ITEMS POST DISCHARGE  None    DISCHARGE DIAGNOSES  Principal Problem:    Fibula fracture POA: Unknown  Active Problems:    Dog bite POA: Unknown  Resolved Problems:    * No resolved hospital problems. *      FOLLOW UP  No future appointments.  Jagjit Vegas M.D.  [address]  [address]  [facility] [id number] [phone]      Call ASAP to schedule fu appt for 2 weeks postop for wound check      MEDICATIONS ON DISCHARGE     Medication List      START taking these medications      Instructions    amoxicillin-clavulanate 875-125 MG Tabs  Commonly known as: AUGMENTIN   Take 1 tablet by mouth 2 times a day for 7 days.  Dose: 1 tablet     HYDROcodone/acetaminophen  MG Tabs  Commonly known as: Norco   Take 0.5-1 Tablets by mouth every 6 hours as needed for up to 7 days.  Dose: 0.5-1 tablet        STOP taking these medications    norethindrone 0.35 MG tablet  Commonly known as: MICRONOR     penicillin v potassium 500 MG Tabs  Commonly known as: VEETID            Allergies  No Known Allergies    DIET  Orders Placed This Encounter   Procedures   • Diet NPO     Standing Status:   Standing     Number of Occurrences:   1     Order Specific Question:   Restrict to:     Answer:   Strict [1]       ACTIVITY  As tolerated.  Weight bearing as tolerated    CONSULTATIONS  Dr Vegas    PROCEDURES  Debridement of dog bite    LABORATORY  Lab Results   Component Value Date    SODIUM 140 05/31/2021    POTASSIUM 4.1 05/31/2021    CHLORIDE 104 05/31/2021    CO2 24 05/31/2021    GLUCOSE 111 (H) 05/31/2021    BUN 19 05/31/2021    CREATININE 0.83 05/31/2021        Lab Results   Component Value Date    WBC 9.3 05/31/2021    HEMOGLOBIN 13.1 05/31/2021    HEMATOCRIT 39.9 05/31/2021    PLATELETCT 242 05/31/2021        Total time of the discharge process exceeds 31 minutes.

## 2021-06-01 NOTE — OR NURSING
1819: To PACU from OR via bed, sleeping, respirations spontaneous and non-labored via OPA. Icepack applied over c/d/i  R knee surgical dressings. +2 R DP, R foot pink warm with brisk CRF.  1830: No change.  1833: Rouses spontaneously, denies pain, returns to sleep.  1845: Rouses briefly, verbalizes comfort, O2 d/sara  1900: Sleeping, not roused at this time  1915: Rouses briefly, c/o some pain but returns to sleep when not actively conversing so not medicated at this time. No change in surgical site assessment. Meets criteria to transfer to floor.

## 2021-06-01 NOTE — DISCHARGE INSTRUCTIONS
--WBAT RLE  --Rx for augmentin x 7 days  --fu 7-10 days for wound check  --okay for knee ROM as tolerated    Okay to take OTC ibuprofen and/or tylenol as needed to stop mild postop pain  Okay to change dressing after 4-5 days, otherwise keep wound clean/dry/covered     ACTIVITY: Rest and take it easy for the first 24 hours.  A responsible adult is recommended to remain with you during that time.  It is normal to feel sleepy.  We encourage you to not do anything that requires balance, judgment or coordination.    MILD FLU-LIKE SYMPTOMS ARE NORMAL. YOU MAY EXPERIENCE GENERALIZED MUSCLE ACHES, THROAT IRRITATION, HEADACHE AND/OR SOME NAUSEA.    FOR 24 HOURS DO NOT:  Drive, operate machinery or run household appliances.  Drink beer or alcoholic beverages.   Make important decisions or sign legal documents.    SPECIAL INSTRUCTIONS:     DIET: To avoid nausea, slowly advance diet as tolerated, avoiding spicy or greasy foods for the first day.  Add more substantial food to your diet according to your physician's instructions.  INCREASE FLUIDS AND FIBER TO AVOID CONSTIPATION.    SURGICAL DRESSING/BATHING:    FOLLOW-UP APPOINTMENT:  A follow-up appointment should be arranged with your doctor; call tomorrow to schedule.    You should CALL YOUR PHYSICIAN if you develop:  Fever greater than 101 degrees F.  Pain not relieved by medication, or persistent nausea or vomiting.  Excessive bleeding (blood soaking through dressing) or unexpected drainage from the wound.  Extreme redness or swelling around the incision site, drainage of pus or foul smelling drainage.  Inability to urinate or empty your bladder within 8 hours.  Problems with breathing or chest pain.    You should call 911 if you develop problems with breathing or chest pain.  If you are unable to contact your doctor or surgical center, you should go to the nearest emergency room or urgent care center.      If any questions arise, call your doctor. The Contact Center is  open Monday through Friday 7AM to 5PM and may speak to a nurse at [phone], or toll free at [phone].     A registered nurse may call you a few days after your surgery to see how you are doing after your procedure.    MEDICATIONS: Resume taking daily medication.  Take prescribed pain medication with food.  If no medication is prescribed, you may take non-aspirin pain medication if needed.  PAIN MEDICATION CAN BE VERY CONSTIPATING.  Take a stool softener or laxative such as senokot, pericolace, or milk of magnesia if needed.    Prescription E-prescribed to pharmacy on record (see page 1 of these instructions).    If your physician has prescribed pain medication that includes Acetaminophen (Tylenol), do not take additional Acetaminophen (Tylenol) while taking the prescribed medication.    Depression / Suicide Risk    As you are discharged from this [facility] facility, it is important to learn how to keep safe from harming yourself.    Recognize the warning signs:  · Abrupt changes in personality, positive or negative- including increase in energy   · Giving away possessions  · Change in eating patterns- significant weight changes-  positive or negative  · Change in sleeping patterns- unable to sleep or sleeping all the time   · Unwillingness or inability to communicate  · Depression  · Unusual sadness, discouragement and loneliness  · Talk of wanting to die  · Neglect of personal appearance   · Rebelliousness- reckless behavior  · Withdrawal from people/activities they love  · Confusion- inability to concentrate     If you or a loved one observes any of these behaviors or has concerns about self-harm, here's what you can do:  · Talk about it- your feelings and reasons for harming yourself  · Remove any means that you might use to hurt yourself (examples: pills, rope, extension cords, firearm)  · Get professional help from the community (Mental Health, Substance Abuse, psychological counseling)  · Do not  be alone:Call your Safe Contact- someone whom you trust who will be there for you.  · Call your local CRISIS HOTLINE 507-4167 or 529-573-2162  · Call your local Children's Mobile Crisis Response Team [place] (453) 153-5808 or www.EZMove  · Call the toll free National Suicide Prevention Hotlines   · National Suicide Prevention Lifeline 824-538-VSHX (3386)  · [employer] Line Network 800-SUICIDE (869-3514)        Animal Bite, Adult  Animal bites range from mild to serious. An animal bite can result in any of these injuries:  · A scratch.  · A deep, open cut.  · A puncture of the skin.  · A crush injury.  · Tearing away of the skin or a body part.  · A bone injury.  A small bite from a house pet is usually less serious than a bite from a stray or wild animal, such as a raccoon, rosas, skunk, or bat. That is because stray and wild animals have a higher risk of carrying a serious infection called rabies, which can be passed to humans through a bite.  What increases the risk?  You are more likely to be bitten by an animal if:  · You are around unfamiliar pets.  · You disturb an animal when it is eating, sleeping, or caring for its babies.  · You are outdoors in a place where small, wild animals roam freely.  What are the signs or symptoms?  Common symptoms of an animal bite include:  · Pain.  · Bleeding.  · Swelling.  · Bruising.  How is this diagnosed?  This condition may be diagnosed based on a physical exam and medical history. Your health care provider will examine your wound and ask for details about the animal and how the bite happened. You may also have tests, such as:  · Blood tests to check for infection.  · X-rays to check for damage to bones or joints.  · Taking a fluid sample from your wound and checking it for infection (culture test).  How is this treated?  Treatment varies depending on the type of animal, where the bite is on your body, and your medical history. Treatment may  include:  · Caring for the wound. This often includes cleaning the wound, rinsing out (flushing) the wound with saline solution, and applying a bandage (dressing). In some cases, the wound may be closed with stitches (sutures), staples, skin glue, or adhesive strips.  · Antibiotic medicine to prevent or treat infection. This medicine may be prescribed in pill or ointment form. If the bite area becomes infected, the medicine may be given through an IV.  · A tetanus shot to prevent tetanus infection.  · Rabies treatment to prevent rabies infection. This will be done if the animal could have rabies.  · Surgery. This may be done if a bite gets infected or if there is damage that needs to be repaired.  Follow these instructions at home:  Wound care    · Follow instructions from your health care provider about how to take care of your wound. Make sure you:  ? Wash your hands with soap and water before you change your dressing. If soap and water are not available, use hand .  ? Change your dressing as told by your health care provider.  ? Leave sutures, skin glue, or adhesive strips in place. These skin closures may need to stay in place for 2 weeks or longer. If adhesive strip edges start to loosen and curl up, you may trim the loose edges. Do not remove adhesive strips completely unless your health care provider tells you to do that.  · Check your wound every day for signs of infection. Check for:  ? More redness, swelling, or pain.  ? More fluid or blood.  ? Warmth.  ? Pus or a bad smell.  Medicines  · Take or apply over-the-counter and prescription medicines only as told by your health care provider.  · If you were prescribed an antibiotic, take or apply it as told by your health care provider. Do not stop using the antibiotic even if your condition improves.  General instructions    · Keep the injured area raised (elevated) above the level of your heart while you are sitting or lying down, if this is  possible.  · If directed, put ice on the injured area.  ? Put ice in a plastic bag.  ? Place a towel between your skin and the bag.  ? Leave the ice on for 20 minutes, 2-3 times per day.  · Keep all follow-up visits as told by your health care provider. This is important.  Contact a health care provider if:  · You have more redness, swelling, or pain around your wound.  · Your wound feels warm to the touch.  · You have a fever or chills.  · You have a general feeling of sickness (malaise).  · You feel nauseous or you vomit.  · You have pain that does not get better.  Get help right away if:  · You have a red streak that leads away from your wound.  · You have non-clear fluid or more blood coming from your wound.  · There is pus or a bad smell coming from your wound.  · You have trouble moving your injured area.  · You have numbness or tingling that extends beyond the wound.  Summary  · Animal bites can range from mild to serious. An animal bite can cause a scratch on the skin, a deep open cut, a puncture of the skin, a crush injury, tearing away of the skin or a body part, or a bone injury.  · Your health care provider will examine your wound and ask for details about the animal and how the bite happened.  · You may also have tests such as a blood test, X-ray, or testing of a fluid sample from your wound (culture test).  · Treatment may include wound care, antibiotic medicine, a tetanus shot, and rabies treatment if the animal could have rabies.  This information is not intended to replace advice given to you by your health care provider.      Diet: Regular  Activity: Weight bear as tolerated with crutches  Follow Up: Please call Dr Vegas office for an appointment in 7-10 days for a wound recheck  Disposition:home  Diagnosis: Fibula fracture  Follow up with your Primary Care Provider Pcp Pt States None as scheduled or sooner if your symptoms persist or worsen.  Return to Emergency Room for severe chest pain,  shortness of breath, signs of a stroke, or any other emergencies.  Please keep wound clean, dry and dressed - you can change the dressings in 4-5 days. If you develop any increase in redness/warmth or pus coming from the wound, please contact Dr Vegas immediately, or come to the ER.       Discharge Instructions    Discharged to home by car with friend. Discharged via wheelchair, hospital escort: Yes.  Special equipment needed: Crutches    Be sure to schedule a follow-up appointment with your primary care doctor or any specialists as instructed.     Discharge Plan:        I understand that a diet low in cholesterol, fat, and sodium is recommended for good health. Unless I have been given specific instructions below for another diet, I accept this instruction as my diet prescription.   Other diet: Regular as tolerated    Special Instructions: Discharge instructions for the Orthopedic Patient    Follow up with Primary Care Physician within 2 weeks of discharge to home, regarding:  Review of medications and diagnostic testing.  Surveillance for medical complications.  Workup and treatment of osteoporosis, if appropriate.     -Is this a Hip/Knee/Shoulder Joint Replacement patient? No    -Is this patient being discharged with medication to prevent blood clots?  No    · Is patient discharged on Warfarin / Coumadin?   No

## 2021-06-01 NOTE — PROGRESS NOTES
45yoF with right knee area puncture wounds and open proximal fibula fracture from dog bite.    S: Denies injuries other than dog bite to right posterolateral knee, denies numbness or sensory changes in foot    O:  Vitals:    05/31/21 1600   BP: 107/73   Pulse: 79   Resp: 18   Temp: 36.8 °C (98.2 °F)   SpO2: 100%     Exam:  General-NAD, alert and following commands  RLE-lateral and posterior puncture wounds/lacerations without active bleeding, gauze in place, +EHL/FHL/TA/GS motor, SILT diffusely in foot, palp dp pulse    A: 45yoF with right knee area puncture wounds and open proximal fibula fracture from dog bite.    Recs:  --Given open proximal fibula fracture from dog bite I recommend formal incision and drainage in the OR.  Likely will be able to discharge to home with PO abx postop.  See full dictated consultation for further details.

## 2021-06-01 NOTE — OP REPORT
DATE OF SERVICE:  05/31/2021     PREOPERATIVE DIAGNOSES:  1.  Multiple lacerations/puncture wound to the right proximal leg from a dog   bite.  2.  Right open proximal fibula fracture.     POSTOPERATIVE DIAGNOSES:  1.  Multiple lacerations/puncture wound to the right proximal leg from a dog   bite.  2.  Right open proximal fibula fracture.     PROCEDURES PERFORMED:  1.  Excisional debridement of right open proximal fibular fracture including   skin, subcutaneous tissue, muscle and bone through a wound.  2.  Excisional debridement of multiple lacerations to the right leg, totaling   about 4 cm in length including skin and subcutaneous tissue.  3.  Repair of multiple lacerations, right proximal leg, totaling about 6 cm   and all with a simple repair.     SURGEON:  Jagjit Vegas MD     ANESTHESIOLOGIST:  Deshawn Crocker MD     ANESTHESIA:  General.     ESTIMATED BLOOD LOSS:  Minimal.     INDICATIONS FOR PROCEDURE:  The patient is a 45-year-old female.  She was bit   by a dog earlier today, she thinks it was a pit bull, on the posterior aspect   of the right knee.  She presented to the Emergency Department, had multiple   puncture/lacerations to the proximal leg posteriorly and laterally.  X-ray   revealed an open fracture of the proximal fibula from a direct bite wound from   the dog.  She was given Unasyn for infection prophylaxis and I made   preparations to go to the operating room for formal excisional debridement to   minimize risk of infection.  She signed informed consent preoperatively and   she wished to proceed with surgery as outlined above.  She was neurovascularly   intact distally preoperatively and had no sign of obvious peroneal nerve   injury despite the location of the wound.     DESCRIPTION OF PROCEDURE:  The patient was met in the preoperative holding   area.  Her surgical site was signed.  Her consent was confirmed to be   accurate.  She was taken back to the operating room and general  anesthesia was   induced.  The right lower extremity had a tourniquet applied; however, it was   not inflated throughout the duration of the procedure.  There were multiple   lacerations proximally, 4 in all, because there were 2 very small 5 mm   lacerations or puncture wounds just directly over the head of the fibula,   which I extended and incorporated excising the wound area including skin and   subcutaneous tissue.  Blunt dissection was then performed down through fascia   to the open fracture.  I excised a small avulsion fracture off the proximal   fibula and thoroughly excisionally debrided the open fracture including skin,   subcutaneous tissue, muscle and bone through this wound, which now measured   about 4 cm in length.  Once sufficient excisional debridement of the open   fracture was performed, I then evaluated the other 3 lacerations, which were   all about 1.5 cm in length posteriorly to the knee at the popliteal crease   area and they did not extend down deep to fascia, but were through with   subcutaneous tissue and the skin.  I excised devitalized skin, subcutaneous   tissue and thoroughly irrigated the wound with Pulsavac using normal saline,   which was also done at the open proximal fibula fracture.  Once the wounds   were all thoroughly excisionally debrided, I then reapproximated the skin   edges loosely with a 3-0 nylon and applied a sterile compressive dressing.    The drapes were then removed.  She was awokened from anesthesia and   transferred on the [place] and taken to postanesthesia care unit in stable   condition.     PLAN:  1.  The patient will be discharged home with the PACU when she recovers from   anesthesia.  2.  She will be discharged with a prescription for Augmentin for infection   prophylaxis and Norco as needed for moderate postoperative pain.  3.  She can weightbear as tolerated on the right lower extremity and can   perform knee range of motion as tolerated.  4.  I would  like to see her back in about 7-10 days for routine wound check   and anticipate the need to leave her sutures in place for about 14 days or so   depending on the rate of healing.        ______________________________  MD KAYLIN Pulido/KILLIAN/MICHAEL    DD:  05/31/2021 18:29  DT:  05/31/2021 18:52    Job#:  [id number]

## 2021-06-01 NOTE — ANESTHESIA TIME REPORT
Anesthesia Start and Stop Event Times     Date Time Event    5/31/2021 1740 Ready for Procedure     1742 Anesthesia Start     1821 Anesthesia Stop        Responsible Staff  05/31/21    Name Role Begin End    Deshawn Crocker M.D. Anesth 1742 1821        Preop Diagnosis (Free Text):  Pre-op Diagnosis     right knee area puncture wounds and open proximal fibula fracture         Preop Diagnosis (Codes):    Post op Diagnosis  Open fracture of proximal fibula      Premium Reason  F. Holiday    Comments:

## 2021-06-01 NOTE — ANESTHESIA POSTPROCEDURE EVALUATION
Patient: Clare Roth    Procedure Summary     Date: 05/31/21 Room / Location:  OR 02 / SURGERY [facility]    Anesthesia Start: 1742 Anesthesia Stop: 1821    Procedure: INCISION AND DRAINAGE, KNEE (Right Knee) Diagnosis: (right knee area puncture wounds and open proximal fibula fracture )    Surgeons: Jagjit Vegas M.D. Responsible Provider: Deshawn Crocker M.D.    Anesthesia Type: general ASA Status: 2 - Emergent          Final Anesthesia Type: general  Last vitals  BP   Blood Pressure: 119/80    Temp   36.6 °C (97.9 °F)    Pulse   80   Resp   16    SpO2   99 %      Anesthesia Post Evaluation    Patient location during evaluation: PACU  Patient participation: complete - patient participated  Level of consciousness: awake and alert  Pain score: 7  Chronic drug use  Airway patency: patent  Anesthetic complications: no  Cardiovascular status: hemodynamically stable  Respiratory status: acceptable  Hydration status: euvolemic    PONV: none          No complications documented.     Nurse Pain Score: 7 (NPRS)

## 2021-06-01 NOTE — OR SURGEON
Immediate Post OP Note    PreOp Diagnosis: Right proximal leg/knee lacerations with open proximal fibula fracture from dog bite      PostOp Diagnosis: same      Procedure(s):  INCISION AND DRAINAGE, KNEE - Wound Class: Dirty or Infected    Surgeon(s):  Jagjit Vegas M.D.    Anesthesiologist/Type of Anesthesia:  Anesthesiologist: Deshawn Crocker M.D./General    Surgical Staff:  Circulator: Sylvester Allen R.N.  Scrub Person: Arley Silva    Specimens removed if any:  * No specimens in log *    Estimated Blood Loss: minimal    Findings: see dictation    Complications: none known    PLAN:  --discharge to home from PACU  --WBAT RLE  --Rx for augmentin x 7 days  --fu 7-10 days for wound check  --okay for knee ROM as tolerated        5/31/2021 6:17 PM Jagjit Vegas M.D.

## 2023-03-03 NOTE — MR AVS SNAPSHOT
Clare Roth   3/7/2017 3:20 PM   Non-Provider Visit   MRN: [id number]    Department:  [facility]   Dept Phone:  [phone]    Description:  Female : 1975   Provider:  MONTY QUINTANA MA           Reason for Visit     Drug / Alcohol Assessment d/s pre employment [email or web address]      Allergies as of 3/7/2017     No Known Allergies      You were diagnosed with     Pre-employment drug screening   [[id number]]         Vital Signs     Last Menstrual Period Smoking Status                2014 Current Every Day Smoker          Basic Information     Date Of Birth Sex Race Ethnicity Preferred Language    1975 Female White Non- English      Problem List              ICD-10-CM Priority Class Noted - Resolved    Habitual aborter, 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   2014 - Present    Supervision of high-risk pregnancy O09.90   2014 - Present    Nausea & vomiting R11.2   2014 - Present    Fetal demise [id number]   2014 - Present      Health Maintenance        Date Due Completion Dates    IMM DTaP/Tdap/Td Vaccine (1 - Tdap) 1996 ---    PAP SMEAR 1998 ---    IMM INFLUENZA (1) 2016 ---            Results     POCT 6 Panel Urine Drug Screen      Component    AMPHETAMINE    POC THC    COCAINE    OPIATES    PHENCYCLIDINE    METHAMPHETAMINES    POC Urine Drug Screen Comment    NEG    Internal Control Positive    Valid    Internal Control Negative    Valid                        Current Immunizations     No immunizations on file.      Below and/or attached are the medications your provider expects you to take. Review all of your home medications and newly ordered medications with your provider and/or pharmacist. Follow medication instructions as directed by your provider and/or pharmacist. Please keep your medication list with you and share with your provider. Update the information when medications are discontinued, doses are changed, or new medications (including  Spoke with patient. Conveyed information below. Patient agrees with plan. Phone numbers given for pain management providers. Please send percocet to [facility]. Please advise.    over-the-counter products) are added; and carry medication information at all times in the event of emergency situations     Allergies:  No Known Allergies          Medications  Valid as of: March 07, 2017 -  5:45 PM    Generic Name Brand Name Tablet Size Instructions for use    Hydrocodone-Acetaminophen (Tab) NORCO 5-325 MG Take 1 Tab by mouth every four hours as needed.        .                 Medicines prescribed today were sent to:     [facility] [address] [place] [address] (), [place] - [address]    [address] () [place] [address]    Phone: [phone] Fax: [phone]    Open 24 Hours?: No      Medication refill instructions:       If your prescription bottle indicates you have medication refills left, it is not necessary to call your provider’s office. Please contact your pharmacy and they will refill your medication.    If your prescription bottle indicates you do not have any refills left, you may request refills at any time through one of the following ways: The online [employer] system (except Urgent Care), by calling your provider’s office, or by asking your pharmacy to contact your provider’s office with a refill request. Medication refills are processed only during regular business hours and may not be available until the next business day. Your provider may request additional information or to have a follow-up visit with you prior to refilling your medication.   *Please Note: Medication refills are assigned a new Rx number when refilled electronically. Your pharmacy may indicate that no refills were authorized even though a new prescription for the same medication is available at the pharmacy. Please request the medicine by name with the pharmacy before contacting your provider for a refill.           MyChart Status: Patient Declined

## 2024-09-26 ENCOUNTER — PHARMACY VISIT (OUTPATIENT)
Dept: PHARMACY | Facility: MEDICAL CENTER | Age: 49
End: 2024-09-26
Payer: COMMERCIAL

## 2024-09-26 ENCOUNTER — HOSPITAL ENCOUNTER (EMERGENCY)
Facility: MEDICAL CENTER | Age: 49
End: 2024-09-26
Attending: STUDENT IN AN ORGANIZED HEALTH CARE EDUCATION/TRAINING PROGRAM
Payer: MEDICAID

## 2024-09-26 VITALS
HEIGHT: 68 IN | OXYGEN SATURATION: 95 % | TEMPERATURE: 97.9 F | HEART RATE: 75 BPM | BODY MASS INDEX: 22.73 KG/M2 | WEIGHT: 150 LBS | SYSTOLIC BLOOD PRESSURE: 135 MMHG | RESPIRATION RATE: 15 BRPM | DIASTOLIC BLOOD PRESSURE: 74 MMHG

## 2024-09-26 DIAGNOSIS — L03.116 CELLULITIS OF LEFT LOWER EXTREMITY: Primary | ICD-10-CM

## 2024-09-26 PROCEDURE — 99282 EMERGENCY DEPT VISIT SF MDM: CPT

## 2024-09-26 PROCEDURE — RXMED WILLOW AMBULATORY MEDICATION CHARGE: Performed by: STUDENT IN AN ORGANIZED HEALTH CARE EDUCATION/TRAINING PROGRAM

## 2024-09-26 RX ORDER — CEPHALEXIN 500 MG/1
500 CAPSULE ORAL 4 TIMES DAILY
Qty: 20 CAPSULE | Refills: 0 | Status: ACTIVE | OUTPATIENT
Start: 2024-09-26 | End: 2024-10-01

## 2024-09-27 NOTE — ED TRIAGE NOTES
"Chief Complaint   Patient presents with    Wound Check     Swelling/erythema on medial aspect of left ankle starting 4 days ago, pt reports it disappeared for 2 days and came back today.   Pt endorses pain at site.    Pt denies any bites or trauma     Pt ambulatory with steady gait  triage room.      Pt is GCS 15, speaking in full sentences, follows commands and responds appropriately to questions. Resp are even and unlabored.        Pt educated on triage process, updated/ agreeable to plan of care. Informed to let staff know of any changes in condition.     BP (!) 144/84   Pulse 86   Temp 36.7 °C (98 °F) (Temporal)   Resp 16   Ht 1.727 m (5' 8\")   Wt 68 kg (150 lb)   SpO2 98%   BMI 22.81 kg/m²     "

## 2024-09-27 NOTE — ED NOTES
Pt provided d/c instructions and verbalizes understanding with no further questions. Rx sent to pt's requested pharmacy. Home care instructions explained. Pt ambulatory to ED nadeem

## 2024-09-27 NOTE — ED PROVIDER NOTES
ED Provider Note    CHIEF COMPLAINT  Chief Complaint   Patient presents with    Wound Check     Swelling/erythema on medial aspect of left ankle starting 4 days ago, pt reports it disappeared for 2 days and came back today.   Pt endorses pain at site.    Pt denies any bites or trauma       EXTERNAL RECORDS REVIEWED  Inpatient Notes patient seen on 5/31/2021 for dog bite, leg laceration, taken to the OR for washout and debridement, discharged on Augmentin    HPI/ROS  LIMITATION TO HISTORY   Select: : None  OUTSIDE HISTORIAN(S):  None    Clare Roth is a 48 y.o. female who presents to the emergency department with redness above her left ankle that she noticed several days ago.  Patient states that she had some last week, got better on its own and then came back.  Patient denies any associated swelling, trauma to the area.  Patient has some dried cracked feet, states that she had a blister on her foot previously.  She denies any fevers, chills, knee pain.  Patient denies any ankle pain.  She is having pain with palpation of the skin over the left medial ankle.    PAST MEDICAL HISTORY   has a past medical history of Arthritis and Rh negative state in antepartum period, third trimester (7/16/2018).    SURGICAL HISTORY   has a past surgical history that includes other abdominal surgery (age12 and 16) and i&d perirectal abscess (Right, 5/31/2021).    FAMILY HISTORY  Family History   Problem Relation Age of Onset    Cancer Mother        SOCIAL HISTORY  Social History     Tobacco Use    Smoking status: Every Day     Current packs/day: 0.10     Average packs/day: 0.1 packs/day for 15.0 years (1.5 ttl pk-yrs)     Types: Cigarettes    Smokeless tobacco: Never   Vaping Use    Vaping status: Never Used   Substance and Sexual Activity    Alcohol use: No    Drug use: Yes     Types: Inhaled, Marijuana     Comment: sunil    Sexual activity: Yes     Partners: Male     Birth control/protection: Condom, Surgical      "Comment: BTL papers signed       CURRENT MEDICATIONS  Home Medications    **Home medications have not yet been reviewed for this encounter**         ALLERGIES  No Known Allergies    PHYSICAL EXAM  VITAL SIGNS: BP (!) 144/84   Pulse 86   Temp 36.7 °C (98 °F) (Temporal)   Resp 16   Ht 1.727 m (5' 8\")   Wt 68 kg (150 lb)   SpO2 98%   BMI 22.81 kg/m²    Constitutional: Awake and alert  HENT: Normal inspection  Eyes: Normal inspection  Neck: Grossly normal range of motion.  Cardiovascular: Normal heart rate, Normal rhythm.  Symmetric peripheral pulses.   Thorax & Lungs: No respiratory distress, No wheezing, No rales, No rhonchi, No chest tenderness.   Abdomen: Bowel sounds normal, soft, non-distended, nontender, no mass  Skin: 10 cm x 4 cm area of erythema just proximal to the medial malleolus, no crepitus, mild tenderness, warmth  Back: No tenderness, No CVA tenderness.   Extremities: No joint tenderness of the left ankle, no tenderness to left knee, no significant lymphadenopathy no clubbing, cyanosis, edema, no Homans or cords.  Patient has dried cracked feet, evidence of healing blisters, no purulence  Neurologic: Grossly normal   Psychiatric: Normal for situation      COURSE & MEDICAL DECISION MAKING    ASSESSMENT, COURSE AND PLAN  Care Narrative: Patient is a 48-year-old female presenting to the emergency department with redness just proximal to her left ankle.  No fevers or chills or systemic signs of illness.  On exam patient has area of erythema, warmth.  No significant edema.  Normal range of motion of all joints of the left lower extremity, no significant lymphadenopathy.  Patient has dried cracked feet.  Differential diagnosis considered.  Likely cellulitis, will treat for nonpurulent cellulitis with Keflex.  Doubt necrotizing soft tissue infection, purulent cellulitis, abscess, DVT or traumatic injury.  Patient will be discharged with PCP follow-up, given referral for PCP as well as clinic information " for [facility].  Patient given strict return precautions and anticipatory guidance which she understood at time of discharge.          ADDITIONAL PROBLEMS MANAGED  none    DISPOSITION AND DISCUSSIONS  I have discussed management of the patient with the following physicians and DEANNA's:  none    Discussion of management with other \[facility]\"" or appropriate source(s): None     Escalation of care considered, and ultimately not performed:Laboratory analysis and diagnostic imaging    Barriers to care at this time, including but not limited to: Patient does not have established PCP.       FINAL DIAGNOSIS  1. Cellulitis of left lower extremity Acute        Electronically signed by: Jeffrey Pathak M.D., 9/26/2024 10:42 PM

## 2024-09-27 NOTE — DISCHARGE INSTRUCTIONS
You are seen and evaluated in the emergency department for your pain, redness of your left lower leg.  This is concerning for an infection.  You will be placed on some antibiotics.  If you are taking the antibiotics for 24 to 48 hours not noting any improvement or noticing worsening, develop any fevers, nausea, vomiting or any concerning symptoms please return to the emergency department for reevaluation.  I put a referral in for you to establish with a PCP, you can try to contact the above clinic for wound reassessment.  Please continue to take over-the-counter medications like Tylenol, anti-inflammatories as needed for pain.

## 2024-10-02 ENCOUNTER — PHARMACY VISIT (OUTPATIENT)
Dept: PHARMACY | Facility: MEDICAL CENTER | Age: 49
End: 2024-10-02
Payer: COMMERCIAL

## 2024-10-02 PROCEDURE — RXMED WILLOW AMBULATORY MEDICATION CHARGE: Performed by: STUDENT IN AN ORGANIZED HEALTH CARE EDUCATION/TRAINING PROGRAM

## 2024-10-02 RX ORDER — SULFAMETHOXAZOLE AND TRIMETHOPRIM 800; 160 MG/1; MG/1
TABLET ORAL
Qty: 14 TABLET | Refills: 0 | OUTPATIENT
Start: 2024-10-02

## 2024-10-02 RX ORDER — CLINDAMYCIN HYDROCHLORIDE 150 MG/1
CAPSULE ORAL
Qty: 63 CAPSULE | Refills: 0 | OUTPATIENT
Start: 2024-10-02

## 2024-10-02 RX ORDER — CEPHALEXIN 500 MG/1
CAPSULE ORAL
Qty: 28 CAPSULE | Refills: 0 | OUTPATIENT
Start: 2024-10-02

## 2024-12-26 ENCOUNTER — HOSPITAL ENCOUNTER (EMERGENCY)
Facility: MEDICAL CENTER | Age: 49
End: 2024-12-26
Attending: STUDENT IN AN ORGANIZED HEALTH CARE EDUCATION/TRAINING PROGRAM
Payer: MEDICAID

## 2024-12-26 VITALS
SYSTOLIC BLOOD PRESSURE: 129 MMHG | WEIGHT: 150 LBS | BODY MASS INDEX: 22.81 KG/M2 | HEART RATE: 65 BPM | DIASTOLIC BLOOD PRESSURE: 85 MMHG | OXYGEN SATURATION: 99 % | TEMPERATURE: 96.5 F | RESPIRATION RATE: 18 BRPM

## 2024-12-26 DIAGNOSIS — R29.0 CARPOPEDAL SPASM: ICD-10-CM

## 2024-12-26 DIAGNOSIS — F15.10 METHAMPHETAMINE USE (HCC): ICD-10-CM

## 2024-12-26 DIAGNOSIS — R11.2 NAUSEA AND VOMITING, UNSPECIFIED VOMITING TYPE: ICD-10-CM

## 2024-12-26 LAB — EKG IMPRESSION: NORMAL

## 2024-12-26 PROCEDURE — 99284 EMERGENCY DEPT VISIT MOD MDM: CPT

## 2024-12-26 PROCEDURE — 93005 ELECTROCARDIOGRAM TRACING: CPT | Mod: TC | Performed by: STUDENT IN AN ORGANIZED HEALTH CARE EDUCATION/TRAINING PROGRAM

## 2024-12-26 PROCEDURE — 99406 BEHAV CHNG SMOKING 3-10 MIN: CPT

## 2024-12-26 PROCEDURE — 93005 ELECTROCARDIOGRAM TRACING: CPT | Mod: TC

## 2024-12-27 NOTE — ED TRIAGE NOTES
Chief Complaint   Patient presents with    Anxiety    N/V    Hyperventilating     Pt admits to meth use 5 hrs ago.

## 2024-12-27 NOTE — ED NOTES
Discharge instructions reviewed with patient and signed. They verbalized understanding of follow up instructions. All belongings with patient. They ambulate with a steady gait. She was provided with food and is calling a ride home

## 2024-12-27 NOTE — ED PROVIDER NOTES
"CHIEF COMPLAINT  Chief Complaint   Patient presents with    Anxiety    N/V    Hyperventilating     Pt admits to meth use 5 hrs ago.         LIMITATION TO HISTORY   Select: None    HPI    Clare Roth is a 49 y.o. female who presents to the Emergency Department for evaluation of feelings of anxiousness.  Patient states about 5 hours prior to arrival she took \"a few hits\" of methamphetamine shortly thereafter she began to feel anxious had several episodes of nonbloody nonbilious vomiting, began to hyperventilate, her fingers, hands, and feet were numb tingly and cramping prompting the visit to the ER.    OUTSIDE HISTORIAN(S):  Select: None    EXTERNAL RECORDS REVIEWED  Select: Other admission note 5/31/2021 seen for a fibula fracture      PAST MEDICAL HISTORY  Past Medical History:   Diagnosis Date    Arthritis     Rh negative state in antepartum period, third trimester 7/16/2018     .    SURGICAL HISTORY  Past Surgical History:   Procedure Laterality Date    PA I&D PERIRECTAL ABSCESS Right 5/31/2021    Procedure: INCISION AND DRAINAGE, KNEE;  Surgeon: Jagjit Vegas M.D.;  Location: SURGERY [facility];  Service: Orthopedics    OTHER ABDOMINAL SURGERY  age14 and 16    hernia repair         FAMILY HISTORY  Family History   Problem Relation Age of Onset    Cancer Mother           SOCIAL HISTORY  Social History     Socioeconomic History    Marital status: Single     Spouse name: Not on file    Number of children: Not on file    Years of education: Not on file    Highest education level: Not on file   Occupational History    Not on file   Tobacco Use    Smoking status: Every Day     Current packs/day: 0.10     Average packs/day: 0.1 packs/day for 15.0 years (1.5 ttl pk-yrs)     Types: Cigarettes    Smokeless tobacco: Never   Vaping Use    Vaping status: Never Used   Substance and Sexual Activity    Alcohol use: No    Drug use: Yes     Types: Inhaled, Marijuana     Comment: sunil    Sexual activity: Yes    "  Partners: Male     Birth control/protection: Condom, Surgical     Comment: BTL papers signed   Other Topics Concern    Not on file   Social History Narrative    Not on file     Social Drivers of Health     Financial Resource Strain: Not on file   Food Insecurity: Not on file   Transportation Needs: Not on file   Physical Activity: Not on file   Stress: Not on file   Social Connections: Not on file   Intimate Partner Violence: Not on file   Housing Stability: Not on file         CURRENT MEDICATIONS  No current facility-administered medications on file prior to encounter.     Current Outpatient Medications on File Prior to Encounter   Medication Sig Dispense Refill    clindamycin (CLEOCIN) 150 MG Cap Take 3 capsules (450 mg total) by mouth 3 (three) times a day for 7 days 63 Capsule 0    cephALEXin (KEFLEX) 500 MG Cap Take 1 capsule (500 mg total) by mouth 4 (four) times a day for 7 days 28 Capsule 0    sulfamethoxazole-trimethoprim (BACTRIM DS) 800-160 MG tablet Take 1 tablet by mouth 2 (two) times a day for 7 days 14 Tablet 0           ALLERGIES  No Known Allergies    PHYSICAL EXAM  VITAL SIGNS:/85   Pulse 66   Temp 35.8 °C (96.5 °F) (Temporal)   Resp 20   Wt 68 kg (150 lb)   SpO2 98%   BMI 22.81 kg/m²       VITALS - vital signs documented prior to this note have been reviewed and noted,  see EHR  GENERAL - awake and alert, no acute distress  HEENT - normocephalic, atraumatic, moist mucus membranes  CARDIOVASCULAR - regular rate and rhythm  PULMONARY - unlabored, no respiratory distress. No audible wheezing or  stridor.  NEUROLOGIC - mental status normal, speech fluid, cognition normal  MUSCULOSKELETAL -no obvious deformity or swelling  DERMATOLOGIC - warm and dry, no visible rashes  PSYCHIATRIC - normal affect, normal concentration      DIAGNOSTIC STUDIES / PROCEDURES  EKG  I have independently interpreted this EKG  Interpreted below by myself as   Borderline prolonged QTc interval normal axis no STEMI  pattern to rotation is sinus rhythm with borderline prolonged QTc  EKG shows a sinus rhythm at a rate of 86 with a normal SC QRS        Radiologist interpretation:   No orders to display        COURSE & MEDICAL DECISION MAKING    ED COURSE:    ED Observation Status? no    INTERVENTIONS BY ME:  Medications - No data to display      INITIAL ASSESSMENT, COURSE AND PLAN  Care Narrative: Patient presented for evaluation of feelings of anxiousness after using methamphetamine earlier in the day.  Upon my assessment patient is a in no acute distress she does have a resting comfortably.  Was describing carpopedal spasms which have since resolved.  An EKG was obtained was without ischemic changes.  She was observed in the emergency department for 3 hours and had achieved clinical sobriety.  I believe the patient symptoms are likely due to her methamphetamine use she was given outpatient detox resources counseled on methamphetamine cessation patient was discharged in stable condition.             ADDITIONAL PROBLEM LIST  NAYA Merida.* spent greater than 3 minutes with the patient explaining the importance of smoking cessation.    DISPOSITION AND DISCUSSIONS      Escalation of care considered, and ultimately not performed:Laboratory analysis and diagnostic imaging    FINAL DIAGNOSIS  1. Methamphetamine use (HCC)    2. Carpopedal spasm    3. Nausea and vomiting, unspecified vomiting type    #4 tobacco use         Electronically signed by: Rupesh Mustafa DO ,9:46 PM 12/26/24

## 2025-02-26 ENCOUNTER — TELEPHONE (OUTPATIENT)
Dept: HEALTH INFORMATION MANAGEMENT | Facility: OTHER | Age: 50
End: 2025-02-26
Payer: MEDICAID

## (undated) DEVICE — GLOVE BIOGEL SZ 8 SURGICAL PF LTX - (50PR/BX 4BX/CA)

## (undated) DEVICE — Device

## (undated) DEVICE — SUTURE GENERAL

## (undated) DEVICE — NEPTUNE 4 PORT MANIFOLD - (20/PK)

## (undated) DEVICE — SUTURE 3-0 ETHILON FS-1 - (36/BX) 30 INCH

## (undated) DEVICE — TOWEL STOP TIMEOUT SAFETY FLAG (40EA/CA)

## (undated) DEVICE — LACTATED RINGERS INJ 1000 ML - (14EA/CA 60CA/PF)

## (undated) DEVICE — ELECTRODE DUAL RETURN W/ CORD - (50/PK)

## (undated) DEVICE — BLADE SURGICAL #11 - (50/BX)

## (undated) DEVICE — PAD PREP 24 X 48 CUFFED - (100/CA)

## (undated) DEVICE — SODIUM CHL IRRIGATION 0.9% 1000ML (12EA/CA)

## (undated) DEVICE — JELLY SURGILUBE STERILE TUBE 4.25 OZ (1/EA)

## (undated) DEVICE — PAD SANITARY 11IN MAXI IND WRAPPED  (12EA/PK 24PK/CA)

## (undated) DEVICE — BRIEF STRETCH MATERNITY M/L - FITS 20-60IN (5EA/BG 20BG/CA)

## (undated) DEVICE — GLOVE, LITE (PAIR)

## (undated) DEVICE — TUBE, CULTURE AEROBIC

## (undated) DEVICE — SWAB ANAEROBIC SPEC.COLLECTOR - (25/PK 4PK/CA 100EA/CA)